# Patient Record
Sex: FEMALE | Race: WHITE | NOT HISPANIC OR LATINO | URBAN - METROPOLITAN AREA
[De-identification: names, ages, dates, MRNs, and addresses within clinical notes are randomized per-mention and may not be internally consistent; named-entity substitution may affect disease eponyms.]

---

## 2017-06-26 ENCOUNTER — EMERGENCY (EMERGENCY)
Facility: HOSPITAL | Age: 47
LOS: 1 days | Discharge: HOME | End: 2017-06-26
Admitting: EMERGENCY MEDICINE
Payer: COMMERCIAL

## 2017-06-26 PROCEDURE — 99284 EMERGENCY DEPT VISIT MOD MDM: CPT

## 2017-06-27 ENCOUNTER — APPOINTMENT (OUTPATIENT)
Dept: RADIOLOGY | Facility: HOSPITAL | Age: 47
End: 2017-06-27

## 2017-06-27 ENCOUNTER — OUTPATIENT (OUTPATIENT)
Dept: OUTPATIENT SERVICES | Facility: HOSPITAL | Age: 47
LOS: 1 days | End: 2017-06-27
Payer: COMMERCIAL

## 2017-06-27 PROCEDURE — 99284 EMERGENCY DEPT VISIT MOD MDM: CPT | Mod: 25

## 2017-06-27 PROCEDURE — 72114 X-RAY EXAM L-S SPINE BENDING: CPT

## 2017-06-27 PROCEDURE — 96372 THER/PROPH/DIAG INJ SC/IM: CPT

## 2017-06-27 PROCEDURE — 81025 URINE PREGNANCY TEST: CPT

## 2019-09-27 ENCOUNTER — RESULT REVIEW (OUTPATIENT)
Age: 49
End: 2019-09-27

## 2019-09-27 ENCOUNTER — OUTPATIENT (OUTPATIENT)
Dept: OUTPATIENT SERVICES | Facility: HOSPITAL | Age: 49
LOS: 1 days | Discharge: HOME | End: 2019-09-27

## 2019-10-01 LAB — SURGICAL PATHOLOGY STUDY: SIGNIFICANT CHANGE UP

## 2019-11-12 ENCOUNTER — TRANSCRIPTION ENCOUNTER (OUTPATIENT)
Age: 49
End: 2019-11-12

## 2020-07-25 ENCOUNTER — TRANSCRIPTION ENCOUNTER (OUTPATIENT)
Age: 50
End: 2020-07-25

## 2020-10-01 ENCOUNTER — TRANSCRIPTION ENCOUNTER (OUTPATIENT)
Age: 50
End: 2020-10-01

## 2020-10-15 ENCOUNTER — RESULT REVIEW (OUTPATIENT)
Age: 50
End: 2020-10-15

## 2020-10-24 ENCOUNTER — TRANSCRIPTION ENCOUNTER (OUTPATIENT)
Age: 50
End: 2020-10-24

## 2020-10-29 ENCOUNTER — TRANSCRIPTION ENCOUNTER (OUTPATIENT)
Age: 50
End: 2020-10-29

## 2020-11-11 ENCOUNTER — EMERGENCY (EMERGENCY)
Facility: HOSPITAL | Age: 50
LOS: 1 days | Discharge: HOME | End: 2020-11-11
Attending: EMERGENCY MEDICINE | Admitting: EMERGENCY MEDICINE
Payer: COMMERCIAL

## 2020-11-11 VITALS
HEIGHT: 69 IN | TEMPERATURE: 97 F | DIASTOLIC BLOOD PRESSURE: 72 MMHG | HEART RATE: 70 BPM | OXYGEN SATURATION: 99 % | WEIGHT: 154.98 LBS | SYSTOLIC BLOOD PRESSURE: 104 MMHG | RESPIRATION RATE: 17 BRPM

## 2020-11-11 DIAGNOSIS — Z20.828 CONTACT WITH AND (SUSPECTED) EXPOSURE TO OTHER VIRAL COMMUNICABLE DISEASES: ICD-10-CM

## 2020-11-11 LAB — SARS-COV-2 RNA SPEC QL NAA+PROBE: SIGNIFICANT CHANGE UP

## 2020-11-11 PROCEDURE — 99283 EMERGENCY DEPT VISIT LOW MDM: CPT

## 2020-11-11 PROCEDURE — U0003: CPT

## 2020-11-11 RX ORDER — BUPROPION HYDROCHLORIDE 150 MG/1
0 TABLET, EXTENDED RELEASE ORAL
Qty: 0 | Refills: 0 | DISCHARGE

## 2020-11-11 NOTE — ED PROVIDER NOTE - PROGRESS NOTE DETAILS
SERGE Barker: Seen pt with Dr. Mcpherson. Agree with above. I documented PE. Pt stable for dc and fu with pmd.

## 2020-11-11 NOTE — ED ADULT NURSE NOTE - OBJECTIVE STATEMENT
Patient endorses exposure to a person who tested positive with Covid today last saturday. patient is asymptomatic, denies, fever, no cough, SOB, nausea, vomiting, dizziness, weakness, diarrhea.

## 2020-11-11 NOTE — ED ADULT NURSE NOTE - NSFALLRSKHARMRISK_ED_ALL_ED
[facility] on file [employer]) sent a fax requesting a refill of   Requested Prescriptions     Pending Prescriptions Disp Refills    fluticasone furoate-vilanteroL (Breo Ellipta) 100-25 mcg/dose inhaler 1 Inhaler 5     Sig: take 1 inhalation by mouth once daily no

## 2020-11-11 NOTE — ED PROVIDER NOTE - NSFOLLOWUPINSTRUCTIONS_ED_ALL_ED_FT
1. You were seen in the ED and underwent testing for the novel coronarvirus (COVID-19). The results are not back yet and you will be contacted with the result which can take up to 7 days.  You will be notified if you test positive for any of these.    2. Until your test results are back, YOU MUST SELF-QUARANTINE until you are told to other otherwise by [facility] or the local [facility] department. This is extremely important to limit the spread of this virus. Please refer closely to the packet provided to you on the specifics of the process of self-quarantine.    3. If you end up testing positive for the virus, you will instructed as to when you can return to your usual activities. If you do not hear from anyone in 7 days, please call [phone] or your local health department for guidance.     4. Return to the ED for difficulty breathing.    5. You may over the counter acetaminophen (Tylenol) 650mg every 6 hours as needed for fever or pain. There is some concern in the medical community about using ibuprofen (Advil, Motrin) and other NSAIDs in people with COVID infections and until there is more research on this subject it may be best to avoid NSAIDs like ibuprofen at the moment unless you have an allergy to acetaminophen (Tylenol).  Do NOT exceed 3500mg acetaminophen in 24 hours.  Please do not take these medications if you do not have pain or fever or if you have any history of liver disease.     -------------    What is a coronavirus?  Coronaviruses are a large family of viruses that cause illnesses ranging from the common cold to more severe diseases such as Middle East Respiratory Syndrome (MERS) and Severe Acute Respiratory Syndrome (SARS).    What is Novel Coronavirus (COVID-19)?  The Centers for Disease Control and Prevention (CDC) is closely monitoring the outbreak caused by COVID-19. For the latest information about COVID-19, visit the CDC website at CDC.gov/Coronavirus    How are coronaviruses spread?  Coronaviruses can be transmitted from person to person, usually after close contact with an infected  person (for example, in a household, workplace, or healthcare setting), via droplets that become airborne after a cough or sneeze. These droplets can then infect a nearby person. Transmission can also occur by touching recently contaminated surfaces.    Is there a treatment for a COVID-19?  There is no specific treatment for disease caused by COVID-19. However, many of the symptoms can be treated based on the patient’s clinical condition. Supportive care for infected persons can be highly effective.    What are the symptoms of coronavirus infection?  It depends on the virus, but common signs include fever and/or respiratory symptoms such as cough and shortness of breath. In more severe cases, infection can cause pneumonia, severe acute respiratory syndrome, kidney failure and even death. Fortunately, most cases of COVID-19 have an illness no different than the influenza (flu), with a majority of these patients having mild symptoms and overall mortality which appears to be not much different than the flu.    What can I do to protect myself?  The best precautionary measures:  – washing your hands  – covering your cough  – disinfecting surfaces  – it is also advisable to avoid close contact with anyone showing symptoms of respiratory illness such as coughing and sneezing  – those with symptoms should wear a surgical mask when around others    What can I do to protect those around me?  If you have been identified as someone who may be infected with COVID-19, we recommend you follow the self-isolation procedures outlined on the following page to protect those around you and to limit the spread of this virus.    We recommend the below precautionary steps from now until 14 days from when you returned from your travel or date of your last known possible contact:    — Do not go to work, school or public areas. Avoid using public transportation, ridesharing or taxis.  — As much as possible, separate yourself from other people in your home. If you can, you should stay in a room and away from other people. Also, you should use a separate bathroom if available.  — Wear the supplied mask whenever you are around other people.  — If you have a non-urgent medical appointment, please reschedule for a later date. If the appointment is urgent, please call the health care provider and tell them that you are on self-isolation for possible COVID-19. This will help the health care provider’s office take steps to keep other people from getting infected or exposed. If you can reschedule routine appointments, do so.  — Wash your hands often with soap and water for at least 15 to 20 seconds or clean your hands with an alcohol-based hand  that contains 60 to 95% alcohol, covering all surfaces of your hands and rubbing them together until they feel dry. Soap and water should be used preferentially if hands are visibly dirty.  — Cover your mouth and nose with a tissue when you cough or sneeze. Throw used tissues in a lined trash can. Immediately wash your hands.  — Avoid touching your eyes, nose, and mouth with your hands.  — Avoid sharing personal household items. You should not share dishes, drinking glasses, cups, eating utensils, towels, or bedding with other people or pets in your home. After using these items, they should be washed thoroughly with soap and water.  — Clean and disinfect all “high-touch” surfaces every day. High touch surfaces include counters, tabletops, doorknobs, light switches, remote controls, bathroom fixtures, toilets, phones, keyboards, tablets, and bedside tables. Also, clean any surfaces that may have blood, stool, or body fluids on them.    ------------------------------------------  Information for patients who have received a COVID-19 test.    The COVID-19 (novel coronavirus) test  Results may take up to 7 days to become available.      If your result is positive, you will receive a phone call from one of our coronavirus specialists. While we will do our best to also call patients with a negative test result, the sheer volume of tests being performed may make this difficult to do in a timely fashion. If you haven’t heard from us within 5 days and you’d like to check on your results, you can check our [facility] or call one of our coronavirus specialists at [address] [place] [address] (available 24/7)    Please DO NOT call the site where you received the test to obtain your results.    If the test is positive -   You will continue home isolation until you are completely well, you have no fever, and it has been at least 14 days since your positive test. The health department in your city or [facility] may also contact you with additional instructions.    If your test is negative -    You will be able to stop home isolation and resume standard precautions, similiar to how you would manage the common cold or flu.  If you have any questions, you can reach out to one of our coronavirus specialists at [address] [place] [address].    REMEMBER - a negative COVID test means you were negative AT THE TIME OF TESTING, and it is possible to have contracted COVID after being tested.

## 2020-11-11 NOTE — ED PROVIDER NOTE - PATIENT PORTAL LINK FT
You can access the FollowMyHealth Patient Portal offered by [facility] by registering at the following website: [email or web address]. By joining [employer]’s FollowMyHealth portal, you will also be able to view your health information using other applications (apps) compatible with our system.

## 2020-11-11 NOTE — ED PROVIDER NOTE - ATTENDING CONTRIBUTION TO CARE
Dr. Mcpherson: I performed a face to face bedside interview with patient regarding history of present illness, review of symptoms and past medical history. I completed an independent physical exam.  I have discussed patient's plan of care with PA.   I agree with note as stated above, having amended the EMR as needed to reflect my findings.   This includes HISTORY OF PRESENT ILLNESS, HIV, PAST MEDICAL/SURGICAL/FAMILY/SOCIAL HISTORY, ALLERGIES AND HOME MEDICATIONS, REVIEW OF SYSTEMS, PHYSICAL EXAM, and any PROGRESS NOTES during the time I functioned as the attending physician for this patient.    see mdm

## 2020-11-11 NOTE — ED PROVIDER NOTE - OBJECTIVE STATEMENT
Dr. Mcpherson: 50F with no PMHx requesting covid testing because she was exposed to an asx pt 4 days ago. Pt has no sx.

## 2020-12-23 ENCOUNTER — EMERGENCY (EMERGENCY)
Facility: HOSPITAL | Age: 50
LOS: 1 days | Discharge: HOME | End: 2020-12-23
Attending: INTERNAL MEDICINE | Admitting: INTERNAL MEDICINE
Payer: COMMERCIAL

## 2020-12-23 VITALS
DIASTOLIC BLOOD PRESSURE: 66 MMHG | TEMPERATURE: 99 F | SYSTOLIC BLOOD PRESSURE: 118 MMHG | RESPIRATION RATE: 16 BRPM | OXYGEN SATURATION: 98 % | WEIGHT: 154.98 LBS | HEIGHT: 69 IN | HEART RATE: 69 BPM

## 2020-12-23 DIAGNOSIS — R51.9 HEADACHE, UNSPECIFIED: ICD-10-CM

## 2020-12-23 PROCEDURE — 99283 EMERGENCY DEPT VISIT LOW MDM: CPT

## 2020-12-23 PROCEDURE — 99284 EMERGENCY DEPT VISIT MOD MDM: CPT

## 2020-12-23 PROCEDURE — U0003: CPT

## 2020-12-23 NOTE — ED PROVIDER NOTE - ATTENDING CONTRIBUTION TO CARE
covid swab aymptomatic pt  Dr. Freeman:  I have reviewed and discussed with the PA/ resident the case specifics, including the history, physical assessment, evaluation, conclusion, laboratory results, and medical plan. I agree with the contents, and conclusions. I have personally examined, and interviewed the patient.

## 2020-12-23 NOTE — ED PROVIDER NOTE - PATIENT PORTAL LINK FT
You can access the FollowMyHealth Patient Portal offered by [facility] by registering at the following website: [email or web address]. By joining [employer]’s FollowMyHealth portal, you will also be able to view your health information using other applications (apps) compatible with our system.

## 2020-12-23 NOTE — ED PROVIDER NOTE - NSFOLLOWUPINSTRUCTIONS_ED_ALL_ED_FT
Viral Illness, Adult  Viruses are tiny germs that can get into a person's body and cause illness. There are many different types of viruses, and they cause many types of illness. Viral illnesses can range from mild to severe. They can affect various parts of the body.  Common illnesses that are caused by a virus include colds and the flu. Viral illnesses also include serious conditions such as HIV/AIDS (human immunodeficiency virus/acquired immunodeficiency syndrome). A few viruses have been linked to certain cancers.  What are the causes?  Many types of viruses can cause illness. Viruses invade cells in your body, multiply, and cause the infected cells to malfunction or die. When the cell dies, it releases more of the virus. When this happens, you develop symptoms of the illness, and the virus continues to spread to other cells. If the virus takes over the function of the cell, it can cause the cell to divide and grow out of control, as is the case when a virus causes cancer.  Different viruses get into the body in different ways. You can get a virus by:  Swallowing food or water that is contaminated with the virus.Breathing in droplets that have been coughed or sneezed into the air by an infected person.Touching a surface that has been contaminated with the virus and then touching your eyes, nose, or mouth.Being bitten by an insect or animal that carries the virus.Having sexual contact with a person who is infected with the virus.Being exposed to blood or fluids that contain the virus, either through an open cut or during a transfusion.If a virus enters your body, your body's defense system (immune system) will try to fight the virus. You may be at higher risk for a viral illness if your immune system is weak.  What are the signs or symptoms?  Symptoms vary depending on the type of virus and the location of the cells that it invades. Common symptoms of the main types of viral illnesses include:  Cold and flu viruses     Fever.Headache.Sore throat.Muscle aches.Nasal congestion.Cough.Digestive system (gastrointestinal) viruses     Fever.Abdominal pain.Nausea.Diarrhea.Liver viruses (hepatitis)     Loss of appetite.Tiredness.Yellowing of the skin (jaundice).Brain and spinal cord viruses     Fever.Headache.Stiff neck.Nausea and vomiting.Confusion or sleepiness.Skin viruses     Warts.Itching.Rash.Sexually transmitted viruses     Discharge.Swelling.Redness.Rash.How is this treated?  Viruses can be difficult to treat because they live within cells. Antibiotic medicines do not treat viruses because these drugs do not get inside cells. Treatment for a viral illness may include:  Resting and drinking plenty of fluids.Medicines to relieve symptoms. These can include over-the-counter medicine for pain and fever, medicines for cough or congestion, and medicines to relieve diarrhea.Antiviral medicines. These drugs are available only for certain types of viruses. They may help reduce flu symptoms if taken early. There are also many antiviral medicines for hepatitis and HIV/AIDS.Some viral illnesses can be prevented with vaccinations. A common example is the flu shot.  Follow these instructions at home:  Medicines        Take over-the-counter and prescription medicines only as told by your health care provider.If you were prescribed an antiviral medicine, take it as told by your health care provider. Do not stop taking the medicine even if you start to feel better.Be aware of when antibiotics are needed and when they are not needed. Antibiotics do not treat viruses. If your health care provider thinks that you may have a bacterial infection as well as a viral infection, you may get an antibiotic.  Do not ask for an antibiotic prescription if you have been diagnosed with a viral illness. That will not make your illness go away faster.Frequently taking antibiotics when they are not needed can lead to antibiotic resistance. When this develops, the medicine no longer works against the bacteria that it normally fights.General instructions     Drink enough fluids to keep your urine clear or pale yellow.Rest as much as possible.Return to your normal activities as told by your health care provider. Ask your health care provider what activities are safe for you.Keep all follow-up visits as told by your health care provider. This is important.How is this prevented?  Take these actions to reduce your risk of viral infection:  Eat a healthy diet and get enough rest.Wash your hands often with soap and water. This is especially important when you are in public places. If soap and water are not available, use hand .Avoid close contact with friends and family who have a viral illness.If you travel to areas where viral gastrointestinal infection is common, avoid drinking water or eating raw food.Keep your immunizations up to date. Get a flu shot every year as told by your health care provider.Do not share toothbrushes, nail clippers, razors, or needles with other people.Always practice safe sex.Contact a health care provider if:  You have symptoms of a viral illness that do not go away.Your symptoms come back after going away.Your symptoms get worse.Get help right away if:  You have trouble breathing.You have a severe headache or a stiff neck.You have severe vomiting or abdominal pain.This information is not intended to replace advice given to you by your health care provider. Make sure you discuss any questions you have with your health care provider.    1. You were seen in the ED and underwent testing for the novel coronarvirus (COVID-19). The results are not back yet and you will be contacted with the result in 5-7 days, but may take longer. You were also tested for other common viruses such as the flu and cold viruses. You will be notified if you test positive for any of these.    2. Until your test results are back, YOU MUST SELF-QUARANTINE until you are told to other otherwise by [facility] or the local [facility] department. This is extremely important to limit the spread of this virus. Please refer closely to the packet provided to you on the specifics of the process of self-quarantine.    3. If you end up testing positive for the virus, you will instructed as to when you can return to your usual activities. If you do not hear from anyone in 7 days, call [address] [place] [address].     4. Return to the ED for difficulty breathing.    5. You may take over the counter acetaminophen (Tylenol) 650mg every 6 hours as needed for fever or pain. There is some concern in the medical community about using ibuprofen (Advil, Motrin) and other NSAIDs in people with COVID infections and until there is more research on this subject it may be best to avoid NSAIDs like ibuprofen, unless you have an allergy to acetaminophen (Tylenol).  Do NOT exceed 3500mg acetaminophen in 24 hours.  Please do not take these medications if you do not have pain or fever or if you have any history of liver disease.     -------------    What is a coronavirus?  Coronaviruses are a large family of viruses that cause illnesses ranging from the common cold to more severe diseases such as Middle East Respiratory Syndrome (MERS) and Severe Acute Respiratory Syndrome (SARS).    What is Novel Coronavirus (COVID-19)?  The Centers for Disease Control and Prevention (CDC) is closely monitoring the outbreak caused by COVID-19. For the latest information about COVID-19, visit the CDC website at CDC.gov/Coronavirus    How are coronaviruses spread?  Coronaviruses can be transmitted from person-toperson, usually after close contact with an infected  person (for example, in a household, workplace, or healthcare setting), via droplets that become airborne after a cough or sneeze. These droplets can then infect a nearby person. Transmission can also occur by touching recently contaminated surfaces.    Is there a treatment for a COVID-19?  There is no specific treatment for disease caused by COVID-19. However, many of the symptoms can be treated based on the patient’s clinical condition. Supportive care for infected persons can be highly effective.    What are the symptoms of coronavirus infection?  It depends on the virus, but common signs include fever and/or respiratory symptoms such as cough and shortness of breath. In more severe cases, infection can cause pneumonia, severe acute respiratory syndrome, kidney failure and even death. Fortunately, most cases of COVID-19 have an illness no different than the influenza (flu), with a majority of these patients having mild symptoms and overall mortality which appears to be not much different than the flu.    What can I do to protect myself?  The best precautionary measures:  – washing your hands  – covering your cough  – disinfecting surfaces  – it is also advisable to avoid close contact with anyone showing symptoms of respiratory illness such as coughing and sneezing  – those with symptoms should wear a surgical mask when around others    What can I do to protect those around me?  If you have been identified as someone who may be infected with COVID-19, we recommend you follow the self-isolation procedures outlined on the following page to protect those around you and to limit the spread of this virus.    We recommend the below precautionary steps from now until 14 days from when you returned from your travel or date of your last known possible contact:    — Do not go to work, school or public areas. Avoid using public transportation, ridesharing or taxis.  — As much as possible, separate yourself from other people in your home. If you can, you should stay in a room and away from other people. Also, you should use a separate bathroom if available.  — Wear the supplied mask whenever you are around other people.  — If you have a non-urgent medical appointment, please reschedule for a later date. If the appointment is urgent, please call the health care provider and tell them that you are on self-isolation for possible COVID-19. This will help the health care provider’s office take steps to keep other people from getting infected or exposed. If you can reschedule routine appointments, do so.  — Wash your hands often with soap and water for at least 15 to 20 seconds or clean your hands with an alcohol-based hand  that contains 60 to 95% alcohol, covering all surfaces of your hands and rubbing them together until they feel dry. Soap and water should be used preferentially if hands are visibly dirty.  — Cover your mouth and nose with a tissue when you cough or sneeze. Throw used tissues in a lined trash can. Immediately wash your hands.  — Avoid touching your eyes, nose, and mouth with your hands.  — Avoid sharing personal household items. You should not share dishes, drinking glasses, cups, eating utensils, towels, or bedding with other people or pets in your home. After using these items, they should be washed thoroughly with soap and water.  — Clean and disinfect all “high-touch” surfaces every day. High touch surfaces include counters, tabletops, doorknobs, light switches, remote controls, bathroom fixtures, toilets, phones, keyboards, tablets, and bedside tables. Also, clean any surfaces that may have blood, stool, or body fluids on them.    ------------------------------------------  Information for patients who have received a COVID-19 test.    The COVID-19 (novel coronavirus) test  Results may take up to 5-7 days to become available.      If your result is positive, you will receive a phone call from one of our coronavirus specialists. While we will do our best to also call patients with a negative test result, the sheer volume of tests being performed may make this difficult to do in a timely fashion. If you haven’t heard from us within 5 days and you’d like to check on your results, you can check our [facility] or call one of our coronavirus specialists at [address] [place] [address] (available 24/7)    Please DO NOT call the site where you received the test to obtain your results.    If the test is positive -   You will continue home isolation until you are completely well, you have no fever, and it has been at least 14 days since your positive test. The health department in your city or county may also contact you with additional instructions.    If your test is negative -    You will be able to stop home isolation and resume standard precautions, similar to how you would manage the common cold or flu.  If you have any questions, you can reach out to one of our coronavirus specialists  at [address] [place] [address].    REMEMBER - a negative COVID test means you were negative AT THE TIME OF TESTING, and it is possible to have contracted COVID after being tested.        CORONAVIRUS DISCHARGE INSTRUCTIONS  COVID-19 (Coronavirus Disease 2019)    WHAT YOU NEED TO KNOW:    COVID-19 is the disease caused by the 2019 novel (new) coronavirus. It was first found in individuals in a part of [place] in late 2019. The virus is spreading to other countries as infected persons travel. Coronaviruses generally cause respiratory (nose, throat, and lung) infections, such as a cold. They can also cause serious infections such as Middle East respiratory syndrome (MERS) and severe acute respiratory syndrome (SARS). The new virus is related to the SARS coronavirus, so it is officially named SARS coronavirus 2 (SARS-CoV-2).    DISCHARGE INSTRUCTIONS:    If you think you or someone you know may be infected: It is important for anyone who may be infected to get tested right away. Do the following to protect others:     If emergency care is needed, tell the  about the possible infection, or call ahead and tell the emergency department.      Call a healthcare provider to be seen in the office. Anyone who may be infected should not arrive without calling first. The provider will need to protect staff members and other patients.      The person who may be infected needs to wear a medical mask before getting medical care. The mask needs to stay on until the provider says to remove it.    Call your local emergency number (911 in the [place]) or go to the emergency department if: You have signs or symptoms of COVID-19, and any of the following is true:     You traveled within the last 14 days to an area where the virus is active or had close contact with someone who did.      You had close contact within the last 14 days with someone who has a confirmed infection.    Call your doctor if: You do not have signs or symptoms of COVID-19, but any of the following is true:     You traveled within the last 14 days to an area where the virus is active or had close contact with someone who did.      You had close contact within the last 14 days with someone who has a confirmed infection.      You have questions or concerns about your condition or care.    Medicines: You may need any of the following for mild symptoms:     Decongestants help reduce nasal congestion and help you breathe more easily. If you take decongestant pills, they may make you feel restless or cause problems with your sleep. Do not use decongestant sprays for more than a few days.      Cough suppressants help reduce coughing. Ask your healthcare provider which type of cough medicine is best for you.      Acetaminophen decreases pain and fever. It is available without a doctor's order. Ask how much to take and how often to take it. Follow directions. Read the labels of all other medicines you are using to see if they also contain acetaminophen, or ask your doctor or pharmacist. Acetaminophen can cause liver damage if not taken correctly. Do not use more than 4 grams (4,000 milligrams) total of acetaminophen in one day.     Take your medicine as directed. Contact your healthcare provider if you think your medicine is not helping or if you have side effects. Tell him or her if you are allergic to any medicine. Keep a list of the medicines, vitamins, and herbs you take. Include the amounts, and when and why you take them. Bring the list or the pill bottles to follow-up visits. Carry your medicine list with you in case of an emergency.    How the 2019 coronavirus spreads: The virus appears to spread quickly and easily. The following are ways the virus is thought to spread, but more information may be coming:     Droplets are the most common way all coronaviruses spread. The virus can travel in droplets that form when a person talks, coughs, or sneezes. Anyone who breathes in the virus or gets it in his or her eyes can become infected.      An infected person may be able to leave the virus on objects and surfaces. Another person can get the virus on his or her hands by touching the object or surface. Infection happens if the person then touches his or her eyes or mouth with unwashed hands. It is not yet known how long the virus can stay on an object or surface. Evidence shows it may be as long as 9 days at room temperature.      Person-to-person contact may spread the virus. For example, an infected person can spread the virus by shaking hands with someone. At this time, it does not appear that the virus can be passed to a baby during pregnancy or delivery. The virus also does not appear to spread during breastfeeding. If you are pregnant or breastfeeding, talk to your healthcare provider or obstetrician about any concerns you have.      An infected animal may be able to infect a person who touches it. This may happen at live markets or on a farm.    Prevent a 2019 coronavirus infection: Everyone should do the following to prevent getting or spreading the virus:     Wash your hands often. Use soap and water every time you wash your hands. Rub your soapy hands together, lacing your fingers. Use the fingers of one hand to scrub under the nails of the other hand. Wash for at least 20 seconds. Rinse with warm, running water for several seconds. Then dry your hands. Use germ-killing gel if soap and water are not available. Do not touch your eyes or mouth without washing your hands first. Handwashing           Cover a sneeze or cough. Use a tissue that covers your mouth and nose. Throw the tissue away in a trash can right away. Use the bend of your arm if a tissue is not available. Wash your hands well with soap and water or use a hand . Do not stand close to anyone who is sneezing or coughing.      Be careful around others. The best way to prevent infection is to avoid anyone who is infected, but this may be difficult. An infected person may be able to spread the virus before signs or symptoms begin. Until more is known, you may not want to shake hands with others. If you do shake hands, wash your hands or use hand  as soon as possible. You do not need to wear a medical mask if you are well and not caring for an infected person.      Ask about vaccines you may need. No vaccine is available for the new coronavirus. But any infection can affect your immune system. A weakened immune system makes you more vulnerable to the new coronavirus. Until a vaccine against the new virus is developed, do the following:   Get a flu vaccine as soon as recommended each year. The flu vaccine is available starting in September or October. Flu viruses change, so it is important to get a flu vaccine every year.      Talk to your healthcare provider about your vaccine history. Tell him or her if you did not get certain vaccines as a child, or you did not get all recommended doses. Tell him or her if you do not know your vaccine history. He or she will tell you which vaccines you need, and when to get them.           If you have COVID-19: Healthcare providers will give you specific instructions to follow. The following are general guidelines to remind you of how to keep others safe until you are well:     Limit close contact with others. Your healthcare provider will tell you when it is okay to be around others. This may be when you do not have a fever, do not take fever medicine, and have no symptoms. Fluid from your respiratory tract will need to test negative for the virus 2 times at least 24 hours apart. Until then, do the following along with any instructions from your provider:   Only go out of the house for medical appointments. Always call the provider’s office first so he or she can prepare to keep others safe.      Stay at least 6 feet (2 meters) away from others.      Sleep in a different room from others in the house.      Do not shake hands with other people.      Wear a medical mask when others are near you. This can help prevent droplets from spreading the virus when you talk, sneeze, or cough.      Do not share items. Do not share dishes, towels, or other items with anyone. Items need to be washed after you use them.      Do not handle live animals. The virus may be spread to animals, including pets. Until more is known, it is best not to touch, play with, or handle live animals.    Self-care:     Drink more liquids as directed. Liquids will help thin and loosen mucus so you can cough it up. Liquids will also help prevent dehydration. Liquids that help prevent dehydration include water, fruit juice, and broth. Do not drink liquids that contain caffeine. Caffeine can increase your risk for dehydration. Ask your healthcare provider how much liquid to drink each day.      Soothe a sore throat. Gargle with warm salt water. This may help your sore throat feel better. Make salt water by dissolving ¼ teaspoon salt in 1 cup warm water. You may also suck on hard candy or throat lozenges. You may use a sore throat spray.      Use a humidifier or vaporizer. Use a cool mist humidifier or a vaporizer to increase air moisture in your home. This may make it easier for you to breathe and help decrease your cough.      Use saline nasal drops as directed. These help relieve congestion.      Apply petroleum-based jelly around the outside of your nostrils. This can decrease irritation from blowing your nose.      Do not smoke. Nicotine and other chemicals in cigarettes and cigars can make your symptoms worse. They can also cause infections such as bronchitis or pneumonia. Ask your healthcare provider for information if you currently smoke and need help to quit. E-cigarettes or smokeless tobacco still contain nicotine. Talk to your healthcare provider before you use these products.    If you take care of someone who has COVID-19:     Wear a medical mask when you are near the person. This can help protect you from droplets that carry the virus when the person talks, sneezes, or coughs.      Do not allow others to go near the person. No one should come to the person's home unless it is necessary. Keep the room's door shut unless you need to go in or out.      Make sure the person's room has good air flow. You may be able to open the window if the weather allows. An air conditioner can also be turned on to help air move.      Clean items the person uses or touches. Wear disposable gloves to handle the person's laundry. Place the laundry in a plastic bag. Use hot water and soap to wash the person's laundry. Wash eating utensils and other items after the person uses them. Throw your gloves away after you use them.      Clean surfaces often. Use bleach diluted with water or disinfecting wipes. Clean counters, doorknobs, toilet seats, and other surfaces.    Follow up with your doctor as directed: Write down your questions so you remember to ask them during your visits.    For more information:     Centers for Disease Control and Prevention  [address]  [place], [place]  Phone: [phone]  Phone: [phone]  Web Address: http://www.cdc.gov

## 2020-12-23 NOTE — ED ADULT NURSE NOTE - NSIMPLEMENTINTERV_GEN_ALL_ED
Implemented All Universal Safety Interventions:  [place] to call system. Call bell, personal items and telephone within reach. Instruct patient to call for assistance. Room bathroom lighting operational. Non-slip footwear when patient is off stretcher. Physically safe environment: no spills, clutter or unnecessary equipment. Stretcher in lowest position, wheels locked, appropriate side rails in place.

## 2020-12-23 NOTE — ED PROVIDER NOTE - OBJECTIVE STATEMENT
pt 49 yo f here for covid tested. +contact with COVID19. mild ha and runny nose  denies rash, abd pain, diarrhea, n/v, vision changes, SOB, CP, difficulty tolerating PO, cough, fever, chills, sore throat, diffuse body aches  no recent travel

## 2020-12-24 LAB — SARS-COV-2 RNA SPEC QL NAA+PROBE: SIGNIFICANT CHANGE UP

## 2020-12-25 ENCOUNTER — TRANSCRIPTION ENCOUNTER (OUTPATIENT)
Age: 50
End: 2020-12-25

## 2021-01-29 ENCOUNTER — TRANSCRIPTION ENCOUNTER (OUTPATIENT)
Age: 51
End: 2021-01-29

## 2021-02-03 ENCOUNTER — APPOINTMENT (OUTPATIENT)
Dept: INTERNAL MEDICINE | Facility: CLINIC | Age: 51
End: 2021-02-03
Payer: COMMERCIAL

## 2021-02-03 ENCOUNTER — NON-APPOINTMENT (OUTPATIENT)
Age: 51
End: 2021-02-03

## 2021-02-03 VITALS
DIASTOLIC BLOOD PRESSURE: 60 MMHG | OXYGEN SATURATION: 98 % | RESPIRATION RATE: 14 BRPM | SYSTOLIC BLOOD PRESSURE: 94 MMHG | BODY MASS INDEX: 22.96 KG/M2 | HEART RATE: 69 BPM | HEIGHT: 69 IN | WEIGHT: 155 LBS | TEMPERATURE: 97.1 F

## 2021-02-03 DIAGNOSIS — R92.2 INCONCLUSIVE MAMMOGRAM: ICD-10-CM

## 2021-02-03 DIAGNOSIS — Z23 ENCOUNTER FOR IMMUNIZATION: ICD-10-CM

## 2021-02-03 DIAGNOSIS — Z82.0 FAMILY HISTORY OF EPILEPSY AND OTHER DISEASES OF THE NERVOUS SYSTEM: ICD-10-CM

## 2021-02-03 DIAGNOSIS — Z80.3 FAMILY HISTORY OF MALIGNANT NEOPLASM OF BREAST: ICD-10-CM

## 2021-02-03 PROCEDURE — 90471 IMMUNIZATION ADMIN: CPT

## 2021-02-03 PROCEDURE — 99396 PREV VISIT EST AGE 40-64: CPT | Mod: 25

## 2021-02-03 PROCEDURE — 99072 ADDL SUPL MATRL&STAF TM PHE: CPT

## 2021-02-03 PROCEDURE — 93000 ELECTROCARDIOGRAM COMPLETE: CPT

## 2021-02-03 PROCEDURE — 90715 TDAP VACCINE 7 YRS/> IM: CPT

## 2021-02-03 NOTE — PHYSICAL EXAM
[No Acute Distress] : no acute distress [Well Nourished] : well nourished [Well Developed] : well developed [Well-Appearing] : well-appearing [Normal Sclera/Conjunctiva] : normal sclera/conjunctiva [PERRL] : pupils equal round and reactive to light [EOMI] : extraocular movements intact [Normal Outer Ear/Nose] : the outer ears and nose were normal in appearance [Normal Oropharynx] : the oropharynx was normal [No JVD] : no jugular venous distention [No Lymphadenopathy] : no lymphadenopathy [Supple] : supple [Thyroid Normal, No Nodules] : the thyroid was normal and there were no nodules present [No Respiratory Distress] : no respiratory distress  [No Accessory Muscle Use] : no accessory muscle use [Clear to Auscultation] : lungs were clear to auscultation bilaterally [Normal Rate] : normal rate  [Regular Rhythm] : with a regular rhythm [Normal S1, S2] : normal S1 and S2 [No Murmur] : no murmur heard [No Carotid Bruits] : no carotid bruits [No Abdominal Bruit] : a ~M bruit was not heard ~T in the abdomen [No Varicosities] : no varicosities [Pedal Pulses Present] : the pedal pulses are present [No Edema] : there was no peripheral edema [No Palpable Aorta] : no palpable aorta [No Extremity Clubbing/Cyanosis] : no extremity clubbing/cyanosis [Soft] : abdomen soft [Non Tender] : non-tender [Non-distended] : non-distended [No HSM] : no HSM [Normal Bowel Sounds] : normal bowel sounds [Normal Posterior Cervical Nodes] : no posterior cervical lymphadenopathy [Normal Anterior Cervical Nodes] : no anterior cervical lymphadenopathy [No CVA Tenderness] : no CVA  tenderness [No Spinal Tenderness] : no spinal tenderness [No Joint Swelling] : no joint swelling [Grossly Normal Strength/Tone] : grossly normal strength/tone [No Rash] : no rash [Coordination Grossly Intact] : coordination grossly intact [No Focal Deficits] : no focal deficits [Normal Gait] : normal gait [Deep Tendon Reflexes (DTR)] : deep tendon reflexes were 2+ and symmetric [Normal Affect] : the affect was normal [Normal Insight/Judgement] : insight and judgment were intact [Normal Appearance] : normal in appearance [No Masses] : no palpable masses

## 2021-02-03 NOTE — HEALTH RISK ASSESSMENT
[] : Yes [2 - 4 times a month (2 pts)] : 2-4 times a month (2 points) [1 or 2 (0 pts)] : 1 or 2 (0 points) [No] : In the past 12 months have you used drugs other than those required for medical reasons? No [No falls in past year] : Patient reported no falls in the past year [Patient reported mammogram was normal] : Patient reported mammogram was normal [None] : None [# Of Children ___] : has [unfilled] children [Feels Safe at Home] : Feels safe at home [de-identified] : was smoking 4-5 cigarettes a day [YearQuit] : 2020 [Change in mental status noted] : No change in mental status noted [Language] : denies difficulty with language [MammogramDate] : 01/20 [FreeTextEntry3] :

## 2021-02-03 NOTE — HISTORY OF PRESENT ILLNESS
[FreeTextEntry1] : establish care\par On wellbutrin for a year which made her stop smoking.  [de-identified] : her gyn is Dr Etienne and she saw Dr Gomez for an ablation. \par She is  and soon to be . She has 2 teenage sons and a 13 y/o daughter.

## 2021-02-04 LAB
25(OH)D3 SERPL-MCNC: 30.6 NG/ML
ALBUMIN SERPL ELPH-MCNC: 4.7 G/DL
ALP BLD-CCNC: 46 U/L
ALT SERPL-CCNC: 13 U/L
ANION GAP SERPL CALC-SCNC: 13 MMOL/L
AST SERPL-CCNC: 15 U/L
BASOPHILS # BLD AUTO: 0.04 K/UL
BASOPHILS NFR BLD AUTO: 0.6 %
BILIRUB SERPL-MCNC: 0.3 MG/DL
BUN SERPL-MCNC: 12 MG/DL
CALCIUM SERPL-MCNC: 9.4 MG/DL
CHLORIDE SERPL-SCNC: 102 MMOL/L
CHOLEST SERPL-MCNC: 229 MG/DL
CO2 SERPL-SCNC: 24 MMOL/L
CREAT SERPL-MCNC: 0.72 MG/DL
EOSINOPHIL # BLD AUTO: 0.17 K/UL
EOSINOPHIL NFR BLD AUTO: 2.7 %
ESTIMATED AVERAGE GLUCOSE: 108 MG/DL
FOLATE SERPL-MCNC: 12 NG/ML
GLUCOSE SERPL-MCNC: 86 MG/DL
HBA1C MFR BLD HPLC: 5.4 %
HCT VFR BLD CALC: 37.6 %
HDLC SERPL-MCNC: 86 MG/DL
HGB BLD-MCNC: 12.3 G/DL
IMM GRANULOCYTES NFR BLD AUTO: 0.2 %
LDLC SERPL CALC-MCNC: 104 MG/DL
LYMPHOCYTES # BLD AUTO: 1.67 K/UL
LYMPHOCYTES NFR BLD AUTO: 26.2 %
MAN DIFF?: NORMAL
MCHC RBC-ENTMCNC: 32.4 PG
MCHC RBC-ENTMCNC: 32.7 GM/DL
MCV RBC AUTO: 98.9 FL
MONOCYTES # BLD AUTO: 0.59 K/UL
MONOCYTES NFR BLD AUTO: 9.3 %
NEUTROPHILS # BLD AUTO: 3.89 K/UL
NEUTROPHILS NFR BLD AUTO: 61 %
NONHDLC SERPL-MCNC: 143 MG/DL
PLATELET # BLD AUTO: 344 K/UL
POTASSIUM SERPL-SCNC: 4.3 MMOL/L
PROT SERPL-MCNC: 6.9 G/DL
RBC # BLD: 3.8 M/UL
RBC # FLD: 12.5 %
SARS-COV-2 IGG SERPL IA-ACNC: 0.07 INDEX
SARS-COV-2 IGG SERPL QL IA: NEGATIVE
SODIUM SERPL-SCNC: 139 MMOL/L
TRIGL SERPL-MCNC: 195 MG/DL
TSH SERPL-ACNC: 1.27 UIU/ML
VIT B12 SERPL-MCNC: 418 PG/ML
WBC # FLD AUTO: 6.37 K/UL

## 2021-02-09 ENCOUNTER — TRANSCRIPTION ENCOUNTER (OUTPATIENT)
Age: 51
End: 2021-02-09

## 2021-03-09 ENCOUNTER — EMERGENCY (EMERGENCY)
Facility: HOSPITAL | Age: 51
LOS: 1 days | Discharge: HOME | End: 2021-03-09
Attending: INTERNAL MEDICINE | Admitting: INTERNAL MEDICINE
Payer: COMMERCIAL

## 2021-03-09 VITALS
SYSTOLIC BLOOD PRESSURE: 102 MMHG | TEMPERATURE: 98 F | HEIGHT: 69 IN | OXYGEN SATURATION: 99 % | HEART RATE: 69 BPM | DIASTOLIC BLOOD PRESSURE: 68 MMHG | WEIGHT: 154.98 LBS | RESPIRATION RATE: 16 BRPM

## 2021-03-09 LAB — SARS-COV-2 RNA SPEC QL NAA+PROBE: SIGNIFICANT CHANGE UP

## 2021-03-09 PROCEDURE — U0003: CPT

## 2021-03-09 PROCEDURE — U0005: CPT

## 2021-03-09 PROCEDURE — 99283 EMERGENCY DEPT VISIT LOW MDM: CPT

## 2021-03-09 PROCEDURE — 99282 EMERGENCY DEPT VISIT SF MDM: CPT

## 2021-03-09 NOTE — ED PROVIDER NOTE - OBJECTIVE STATEMENT
(-) Exposure requesting COVID swab. Patient asymptomatic. Denies fever, chills, chest pain, shortness of breath, abdominal pain, nausea, vomiting, diarrhea, weakness. Appears well. Speaking in full sentences, breathing not labored.

## 2021-06-09 ENCOUNTER — TRANSCRIPTION ENCOUNTER (OUTPATIENT)
Age: 51
End: 2021-06-09

## 2021-12-20 ENCOUNTER — EMERGENCY (EMERGENCY)
Facility: HOSPITAL | Age: 51
LOS: 1 days | Discharge: HOME | End: 2021-12-20
Attending: EMERGENCY MEDICINE | Admitting: EMERGENCY MEDICINE
Payer: COMMERCIAL

## 2021-12-20 VITALS
HEIGHT: 69 IN | RESPIRATION RATE: 15 BRPM | HEART RATE: 68 BPM | DIASTOLIC BLOOD PRESSURE: 61 MMHG | WEIGHT: 149.91 LBS | SYSTOLIC BLOOD PRESSURE: 99 MMHG | OXYGEN SATURATION: 96 % | TEMPERATURE: 98 F

## 2021-12-20 PROCEDURE — 99282 EMERGENCY DEPT VISIT SF MDM: CPT

## 2021-12-20 PROCEDURE — U0005: CPT

## 2021-12-20 PROCEDURE — U0003: CPT

## 2021-12-20 PROCEDURE — 99283 EMERGENCY DEPT VISIT LOW MDM: CPT

## 2021-12-20 NOTE — ED PROVIDER NOTE - NSFOLLOWUPINSTRUCTIONS_ED_ALL_ED_FT
May also contact pharmacy to see what has been dispensed previously.     1. You were seen in the ED and underwent testing for the novel coronarvirus (COVID-19). The results are not back yet and you will be contacted with the result.    2. Until your test results are back, YOU MUST SELF-QUARANTINE until you are told to other otherwise by [facility] or the local [facility] health department. This is extremely important to limit the spread of this virus. Please refer closely to the packet provided to you on the specifics of the process of self-quarantine.    3. If you end up testing positive for the virus, you will instructed as to when you can return to your usual activities. If you do not hear from anyone in 7 days, please call [phone] or your local health department for guidance.     4. Return to the ED for difficulty breathing.    5. You may over the counter acetaminophen (Tylenol) 650mg every 6 hours as needed for fever or pain. There is some concern in the medical community about using ibuprofen (Advil, Motrin) and other NSAIDs in people with COVID infections and until there is more research on this subject it may be best to avoid NSAIDs like ibuprofen at the moment unless you have an allergy to acetaminophen (Tylenol).  Do NOT exceed 3500mg acetaminophen in 24 hours.  Please do not take these medications if you do not have pain or fever or if you have any history of liver disease.     -------------    What is a coronavirus?  Coronaviruses are a large family of viruses that cause illnesses ranging from the common cold to more severe diseases such as Middle East Respiratory Syndrome (MERS) and Severe Acute Respiratory Syndrome (SARS).    What is Novel Coronavirus (COVID-19)?  The Centers for Disease Control and Prevention (CDC) is closely monitoring the outbreak caused by COVID-19. For the latest information about COVID-19, visit the CDC website at CDC.gov/Coronavirus    How are coronaviruses spread?  Coronaviruses can be transmitted from person to person, usually after close contact with an infected  person (for example, in a household, workplace, or healthcare setting), via droplets that become airborne after a cough or sneeze. These droplets can then infect a nearby person. Transmission can also occur by touching recently contaminated surfaces.    Is there a treatment for a COVID-19?  There is no specific treatment for disease caused by COVID-19. However, many of the symptoms can be treated based on the patient’s clinical condition. Supportive care for infected persons can be highly effective.    What are the symptoms of coronavirus infection?  It depends on the virus, but common signs include fever and/or respiratory symptoms such as cough and shortness of breath. In more severe cases, infection can cause pneumonia, severe acute respiratory syndrome, kidney failure and even death. Fortunately, most cases of COVID-19 have an illness no different than the influenza (flu), with a majority of these patients having mild symptoms and overall mortality which appears to be not much different than the flu.    What can I do to protect myself?  The best precautionary measures:  – washing your hands  – covering your cough  – disinfecting surfaces  – it is also advisable to avoid close contact with anyone showing symptoms of respiratory illness such as coughing and sneezing  – those with symptoms should wear a surgical mask when around others    What can I do to protect those around me?  If you have been identified as someone who may be infected with COVID-19, we recommend you follow the self-isolation procedures outlined on the following page to protect those around you and to limit the spread of this virus.    We recommend the below precautionary steps from now until 14 days from when you returned from your travel or date of your last known possible contact:    — Do not go to work, school or public areas. Avoid using public transportation, ridesharing or taxis.  — As much as possible, separate yourself from other people in your home. If you can, you should stay in a room and away from other people. Also, you should use a separate bathroom if available.  — Wear the supplied mask whenever you are around other people.  — If you have a non-urgent medical appointment, please reschedule for a later date. If the appointment is urgent, please call the health care provider and tell them that you are on self-isolation for possible COVID-19. This will help the health care provider’s office take steps to keep other people from getting infected or exposed. If you can reschedule routine appointments, do so.  — Wash your hands often with soap and water for at least 15 to 20 seconds or clean your hands with an alcohol-based hand  that contains 60 to 95% alcohol, covering all surfaces of your hands and rubbing them together until they feel dry. Soap and water should be used preferentially if hands are visibly dirty.  — Cover your mouth and nose with a tissue when you cough or sneeze. Throw used tissues in a lined trash can. Immediately wash your hands.  — Avoid touching your eyes, nose, and mouth with your hands.  — Avoid sharing personal household items. You should not share dishes, drinking glasses, cups, eating utensils, towels, or bedding with other people or pets in your home. After using these items, they should be washed thoroughly with soap and water.  — Clean and disinfect all “high-touch” surfaces every day. High touch surfaces include counters, tabletops, doorknobs, light switches, remote controls, bathroom fixtures, toilets, phones, keyboards, tablets, and bedside tables. Also, clean any surfaces that may have blood, stool, or body fluids on them.    ------------------------------------------  Information for patients who have received a COVID-19 test.    The COVID-19 (novel coronavirus) test  Results may take up to 7 days to become available.      If your result is positive, you will receive a phone call from one of our coronavirus specialists. While we will do our best to also call patients with a negative test result, the sheer volume of tests being performed may make this difficult to do in a timely fashion. If you haven’t heard from us within 5 days and you’d like to check on your results, you can check our [employer] mendez or call one of our coronavirus specialists at [address] [place] [address] (available 24/7)    Please DO NOT call the site where you received the test to obtain your results.    If the test is positive -   You will continue home isolation until you are completely well, you have no fever, and it has been at least 14 days since your positive test. The health department in your city or county may also contact you with additional instructions.    If your test is negative -    You will be able to stop home isolation and resume standard precautions, similiar to how you would manage the common cold or flu.  If you have any questions, you can reach out to one of our coronavirus specialists at [address] [place] [address].    REMEMBER - a negative COVID test means you were negative AT THE TIME OF TESTING, and it is possible to have contracted COVID after being tested.

## 2021-12-20 NOTE — ED PROVIDER NOTE - PATIENT PORTAL LINK FT
You can access the FollowMyHealth Patient Portal offered by [facility] by registering at the following website: [email or web address]. By joining [employer]’s FollowMyHealth portal, you will also be able to view your health information using other applications (apps) compatible with our system.

## 2021-12-20 NOTE — ED PROVIDER NOTE - ATTENDING CONTRIBUTION TO CARE
Holly with [facility]. 52 yo female with no medical problems and no complaints, vaccinated with moderna booster Dec 2021. + Contact/exposure to known COVID+ person, requesting COVID19 PCR, home rapid test was reportedly negative.    (+) Exposure requesting COVID swab. Patient asymptomatic. Denies fever, chills, chest pain, shortness of breath, abdominal pain, nausea, vomiting, diarrhea, weakness. Appears well. Speaking in full sentences, breathing not labored.    I performed a face to face bedside interview with patient regarding history of present illness, review of symptoms and past medical history. I completed an independent physical exam.  I have discussed the patient's plan of care with Physician Assistant (PA). I agree with note as stated above, having amended the EMR as needed to reflect my findings.   This includes History of Present Illness, HIV, Past Medical/Surgical/Family/Social History, Allergies and Home Medications, Review of Systems, Physical Exam, and any Progress Notes during the time I functioned as the attending physician for this patient.

## 2021-12-20 NOTE — ED PROVIDER NOTE - CLINICAL SUMMARY MEDICAL DECISION MAKING FREE TEXT BOX
50 yo female with no medical problems and no complaints, vaccinated with moderna booster Dec 2021. + Contact/exposure to known COVID+ person, requesting COVID19 PCR, home rapid test was reportedly negative. 50 yo female with no medical problems and no complaints, vaccinated with moderna booster Dec 2021. + Contact/exposure to known COVID+ person, requesting COVID19 PCR, home rapid test was reportedly negative.      (+) Exposure requesting COVID swab. Patient asymptomatic. Denies fever, chills, chest pain, shortness of breath, abdominal pain, nausea, vomiting, diarrhea, weakness. Appears well. Speaking in full sentences, breathing not labored.

## 2021-12-20 NOTE — ED ADULT NURSE NOTE - NSIMPLEMENTINTERV_GEN_ALL_ED
Implemented All Universal Safety Interventions:  [place] to call system. Call bell, personal items and telephone within reach. Instruct patient to call for assistance. Room bathroom lighting operational. Non-slip footwear when patient is off stretcher. Physically safe environment: no spills, clutter or unnecessary equipment. Stretcher in lowest position, wheels locked, appropriate side rails in place.

## 2021-12-20 NOTE — ED PROVIDER NOTE - PRINCIPAL DIAGNOSIS
-[place] Sleepiness Score WNL, but STOP-BANG score 4 today  -Mallampati III-IV on exam, concurrent HTN & morbid obesity  -Referral to Sleep Medicine for further evaluation & possible PSG Encounter for screening

## 2021-12-20 NOTE — ED PROVIDER NOTE - OBJECTIVE STATEMENT
50 yo female with no medical problems and no complaints, vaccinated with moderna booster Dec 2021. + Contact/exposure to known COVID+ person, requesting COVID19 PCR, home rapid test was reportedly negative.

## 2021-12-21 LAB — SARS-COV-2 RNA SPEC QL NAA+PROBE: SIGNIFICANT CHANGE UP

## 2022-02-17 ENCOUNTER — TRANSCRIPTION ENCOUNTER (OUTPATIENT)
Age: 52
End: 2022-02-17

## 2022-03-13 ENCOUNTER — EMERGENCY (EMERGENCY)
Facility: HOSPITAL | Age: 52
LOS: 1 days | Discharge: HOME | End: 2022-03-13
Attending: INTERNAL MEDICINE | Admitting: INTERNAL MEDICINE
Payer: COMMERCIAL

## 2022-03-13 VITALS
SYSTOLIC BLOOD PRESSURE: 128 MMHG | OXYGEN SATURATION: 100 % | WEIGHT: 160.06 LBS | HEART RATE: 87 BPM | TEMPERATURE: 98 F | RESPIRATION RATE: 16 BRPM | HEIGHT: 69 IN | DIASTOLIC BLOOD PRESSURE: 75 MMHG

## 2022-03-13 LAB
ALBUMIN SERPL ELPH-MCNC: 4.1 G/DL — SIGNIFICANT CHANGE UP (ref 3.3–5)
ALP SERPL-CCNC: 48 U/L — SIGNIFICANT CHANGE UP (ref 40–120)
ALT FLD-CCNC: 29 U/L — SIGNIFICANT CHANGE UP (ref 10–45)
ANION GAP SERPL CALC-SCNC: 9 MMOL/L — SIGNIFICANT CHANGE UP (ref 5–17)
APPEARANCE UR: CLEAR — SIGNIFICANT CHANGE UP
AST SERPL-CCNC: 21 U/L — SIGNIFICANT CHANGE UP (ref 10–40)
BASOPHILS # BLD AUTO: 0.03 K/UL — SIGNIFICANT CHANGE UP (ref 0–0.2)
BASOPHILS NFR BLD AUTO: 0.5 % — SIGNIFICANT CHANGE UP (ref 0–2)
BILIRUB SERPL-MCNC: 0.4 MG/DL — SIGNIFICANT CHANGE UP (ref 0.2–1.2)
BILIRUB UR-MCNC: NEGATIVE — SIGNIFICANT CHANGE UP
BUN SERPL-MCNC: 6 MG/DL — LOW (ref 7–23)
CALCIUM SERPL-MCNC: 8.5 MG/DL — SIGNIFICANT CHANGE UP (ref 8.4–10.5)
CHLORIDE SERPL-SCNC: 108 MMOL/L — SIGNIFICANT CHANGE UP (ref 96–108)
CO2 SERPL-SCNC: 27 MMOL/L — SIGNIFICANT CHANGE UP (ref 22–31)
COLOR SPEC: YELLOW — SIGNIFICANT CHANGE UP
CREAT SERPL-MCNC: 0.71 MG/DL — SIGNIFICANT CHANGE UP (ref 0.5–1.3)
DIFF PNL FLD: NEGATIVE — SIGNIFICANT CHANGE UP
EGFR: 103 ML/MIN/1.73M2 — SIGNIFICANT CHANGE UP
EOSINOPHIL # BLD AUTO: 0.15 K/UL — SIGNIFICANT CHANGE UP (ref 0–0.5)
EOSINOPHIL NFR BLD AUTO: 2.7 % — SIGNIFICANT CHANGE UP (ref 0–6)
GLUCOSE SERPL-MCNC: 97 MG/DL — SIGNIFICANT CHANGE UP (ref 70–99)
GLUCOSE UR QL: NEGATIVE — SIGNIFICANT CHANGE UP
HCT VFR BLD CALC: 35.8 % — SIGNIFICANT CHANGE UP (ref 34.5–45)
HGB BLD-MCNC: 11.9 G/DL — SIGNIFICANT CHANGE UP (ref 11.5–15.5)
IMM GRANULOCYTES NFR BLD AUTO: 0.4 % — SIGNIFICANT CHANGE UP (ref 0–1.5)
KETONES UR-MCNC: NEGATIVE — SIGNIFICANT CHANGE UP
LEUKOCYTE ESTERASE UR-ACNC: NEGATIVE — SIGNIFICANT CHANGE UP
LYMPHOCYTES # BLD AUTO: 1.83 K/UL — SIGNIFICANT CHANGE UP (ref 1–3.3)
LYMPHOCYTES # BLD AUTO: 32.9 % — SIGNIFICANT CHANGE UP (ref 13–44)
MCHC RBC-ENTMCNC: 32.7 PG — SIGNIFICANT CHANGE UP (ref 27–34)
MCHC RBC-ENTMCNC: 33.2 GM/DL — SIGNIFICANT CHANGE UP (ref 32–36)
MCV RBC AUTO: 98.4 FL — SIGNIFICANT CHANGE UP (ref 80–100)
MONOCYTES # BLD AUTO: 0.49 K/UL — SIGNIFICANT CHANGE UP (ref 0–0.9)
MONOCYTES NFR BLD AUTO: 8.8 % — SIGNIFICANT CHANGE UP (ref 2–14)
NEUTROPHILS # BLD AUTO: 3.05 K/UL — SIGNIFICANT CHANGE UP (ref 1.8–7.4)
NEUTROPHILS NFR BLD AUTO: 54.7 % — SIGNIFICANT CHANGE UP (ref 43–77)
NITRITE UR-MCNC: NEGATIVE — SIGNIFICANT CHANGE UP
NRBC # BLD: 0 /100 WBCS — SIGNIFICANT CHANGE UP (ref 0–0)
PH UR: 8 — SIGNIFICANT CHANGE UP (ref 5–8)
PLATELET # BLD AUTO: 287 K/UL — SIGNIFICANT CHANGE UP (ref 150–400)
POTASSIUM SERPL-MCNC: 4 MMOL/L — SIGNIFICANT CHANGE UP (ref 3.5–5.3)
POTASSIUM SERPL-SCNC: 4 MMOL/L — SIGNIFICANT CHANGE UP (ref 3.5–5.3)
PROT SERPL-MCNC: 7 G/DL — SIGNIFICANT CHANGE UP (ref 6–8.3)
PROT UR-MCNC: NEGATIVE — SIGNIFICANT CHANGE UP
RBC # BLD: 3.64 M/UL — LOW (ref 3.8–5.2)
RBC # FLD: 13.2 % — SIGNIFICANT CHANGE UP (ref 10.3–14.5)
SODIUM SERPL-SCNC: 144 MMOL/L — SIGNIFICANT CHANGE UP (ref 135–145)
SP GR SPEC: 1.01 — SIGNIFICANT CHANGE UP (ref 1.01–1.02)
UROBILINOGEN FLD QL: NEGATIVE — SIGNIFICANT CHANGE UP
WBC # BLD: 5.57 K/UL — SIGNIFICANT CHANGE UP (ref 3.8–10.5)
WBC # FLD AUTO: 5.57 K/UL — SIGNIFICANT CHANGE UP (ref 3.8–10.5)

## 2022-03-13 PROCEDURE — 74177 CT ABD & PELVIS W/CONTRAST: CPT | Mod: MA

## 2022-03-13 PROCEDURE — 87086 URINE CULTURE/COLONY COUNT: CPT

## 2022-03-13 PROCEDURE — 99284 EMERGENCY DEPT VISIT MOD MDM: CPT | Mod: 25

## 2022-03-13 PROCEDURE — 76830 TRANSVAGINAL US NON-OB: CPT | Mod: 26

## 2022-03-13 PROCEDURE — 87591 N.GONORRHOEAE DNA AMP PROB: CPT

## 2022-03-13 PROCEDURE — 80053 COMPREHEN METABOLIC PANEL: CPT

## 2022-03-13 PROCEDURE — 87491 CHLMYD TRACH DNA AMP PROBE: CPT

## 2022-03-13 PROCEDURE — 96374 THER/PROPH/DIAG INJ IV PUSH: CPT | Mod: XU

## 2022-03-13 PROCEDURE — 36415 COLL VENOUS BLD VENIPUNCTURE: CPT

## 2022-03-13 PROCEDURE — 96375 TX/PRO/DX INJ NEW DRUG ADDON: CPT

## 2022-03-13 PROCEDURE — 85025 COMPLETE CBC W/AUTO DIFF WBC: CPT

## 2022-03-13 PROCEDURE — 96361 HYDRATE IV INFUSION ADD-ON: CPT

## 2022-03-13 PROCEDURE — 74177 CT ABD & PELVIS W/CONTRAST: CPT | Mod: 26,MA

## 2022-03-13 PROCEDURE — 76830 TRANSVAGINAL US NON-OB: CPT

## 2022-03-13 PROCEDURE — 99285 EMERGENCY DEPT VISIT HI MDM: CPT

## 2022-03-13 RX ORDER — KETOROLAC TROMETHAMINE 30 MG/ML
30 SYRINGE (ML) INJECTION ONCE
Refills: 0 | Status: DISCONTINUED | OUTPATIENT
Start: 2022-03-13 | End: 2022-03-13

## 2022-03-13 RX ORDER — ONDANSETRON 8 MG/1
4 TABLET, FILM COATED ORAL ONCE
Refills: 0 | Status: COMPLETED | OUTPATIENT
Start: 2022-03-13 | End: 2022-03-13

## 2022-03-13 RX ORDER — IBUPROFEN 200 MG
1 TABLET ORAL
Qty: 40 | Refills: 0
Start: 2022-03-13 | End: 2022-03-22

## 2022-03-13 RX ORDER — SODIUM CHLORIDE 9 MG/ML
1000 INJECTION INTRAMUSCULAR; INTRAVENOUS; SUBCUTANEOUS ONCE
Refills: 0 | Status: COMPLETED | OUTPATIENT
Start: 2022-03-13 | End: 2022-03-13

## 2022-03-13 RX ORDER — AZITHROMYCIN 500 MG/1
2000 TABLET, FILM COATED ORAL ONCE
Refills: 0 | Status: COMPLETED | OUTPATIENT
Start: 2022-03-13 | End: 2022-03-13

## 2022-03-13 RX ADMIN — Medication 30 MILLIGRAM(S): at 13:20

## 2022-03-13 RX ADMIN — ONDANSETRON 4 MILLIGRAM(S): 8 TABLET, FILM COATED ORAL at 11:26

## 2022-03-13 RX ADMIN — AZITHROMYCIN 2000 MILLIGRAM(S): 500 TABLET, FILM COATED ORAL at 14:47

## 2022-03-13 RX ADMIN — Medication 30 MILLIGRAM(S): at 13:35

## 2022-03-13 RX ADMIN — SODIUM CHLORIDE 1000 MILLILITER(S): 9 INJECTION INTRAMUSCULAR; INTRAVENOUS; SUBCUTANEOUS at 11:26

## 2022-03-13 RX ADMIN — SODIUM CHLORIDE 1000 MILLILITER(S): 9 INJECTION INTRAMUSCULAR; INTRAVENOUS; SUBCUTANEOUS at 12:26

## 2022-03-13 NOTE — ED PROVIDER NOTE - NSFOLLOWUPINSTRUCTIONS_ED_ALL_ED_FT
Follow up with your pmd within 48 hours.   Return to the ED if any worsening or persistent symptoms.         Pelvic Pain in Women    WHAT YOU NEED TO KNOW:    What do I need to know about pelvic pain? You may have pain on one or both sides of your pelvis. Pelvic pain may occur with certain body positions or activities, such as when you have sex or a bowel movement. It may worsen during your monthly period or after you sit or stand for a long time. Chronic pelvic pain is pain that continues for longer than 6 months.    What causes pelvic pain in women?   •Gynecologic conditions, such as pelvic inflammatory disease (PID), endometriosis, or uterine fibroids      •Bowel and bladder conditions, such as irritable bowel syndrome, bladder inflammation, or tumors       •Muscle and nerve conditions, such as swelling or weakness of your pelvic muscles, or damage to the nerves of your pelvic area (neuropathy)      •Psychological issues as a result of physical or sexual abuse, or drug abuse      How is pelvic pain treated?   •Pain medicine may be given in pills, injections, or creams to relieve your pain.       •Hormones may be given if your pain gets worse with your menstrual cycle.      •Antibiotics may be given if your pain is caused by infection.      •Surgery may be done if other treatments do not relieve your pain.       How can I manage my pelvic pain?   •Keep a pain diary. Write down when your pain happens, how severe it is, and any other symptoms you have with your pain. A diary will help you keep track of pain cycles. It may also help your healthcare provider find out what is causing your pain.      •Learn ways to relax. Deep breathing, meditation, and relaxation techniques can help decrease your pain. When you are tense, your pain may increase.      •Change the foods you eat if you have irritable bowel syndrome. Ask your healthcare provider about the best foods for you.       Call 911 for any of the following:   •You have severe chest pain and sudden trouble breathing.          When should I seek immediate care?   •You have heavy or unusual vaginal bleeding, and you feel lightheaded or faint.          When should I contact my healthcare provider?   •You have pelvic pain that does not go away after you take pain medicine.      •You develop new symptoms or your symptoms are worse than before.      •You have questions or concerns about your condition or care.      CARE AGREEMENT:    You have the right to help plan your care. Learn about your health condition and how it may be treated. Discuss treatment options with your healthcare providers to decide what care you want to receive. You always have the right to refuse treatment.        © Copyright [employer] 2022           back to top                          © Copyright [employer] 2022

## 2022-03-13 NOTE — ED PROVIDER NOTE - ATTENDING CONTRIBUTION TO CARE
51 yr old female with hx of depression presents with b/l lower pelvic pain and suprapubic pain for the last 4 days. Pt reports about 1 week ago, she was having sex with her BF and she developed immediate pain, which then resolved. Pain started 4 days ago, and intensified today. Pt last period 1 year ago. Pt denies any urinary complaints, fever, chills, n/v/d or any other symptoms. Pt sexually active with 1 partner. No hx of STD.   on exam- + suprapubic pain and b/l pelvic tenderness  , pelvic exam WNL   labs, UA, UC, GC and US transvaginal ordered  pelvic sono , ct abd neg   Dr. Freeman:  I have reviewed and discussed with the PA/ resident the case specifics, including the history, physical assessment, evaluation, conclusion, laboratory results, and medical plan. I agree with the contents, and conclusions. I have personally examined, and interviewed the patient.

## 2022-03-13 NOTE — ED ADULT NURSE NOTE - CAS ELECT INFOMATION PROVIDED
Attempt # 2 to [phone].    Left non-detailed VM for patient to call back.    ABELINO Fatima, RN, PHN  [facility]    
Attempt # 3     Called # [phone]  Phone rang 2 times then hung up     Encounter closed     Jaqui Shelton RN, BSN  [facility]       
Labs on 2017 have  and a letter was sent on 2017. Patient has not followed up. Routing to team to address.    [facility]  
Non-detailed message left to return our call.    Evon Valle RN   [employer]      
Please review with patient  
DC instructions

## 2022-03-13 NOTE — ED PROVIDER NOTE - CLINICAL SUMMARY MEDICAL DECISION MAKING FREE TEXT BOX
51 yr old female with hx of depression presents with b/l lower pelvic pain and suprapubic pain for the last 4 days. Pt reports about 1 week ago, she was having sex with her BF and she developed immediate pain, which then resolved. Pain started 4 days ago, and intensified today. Pt last period 1 year ago. Pt denies any urinary complaints, fever, chills, n/v/d or any other symptoms. Pt sexually active with 1 partner. No hx of STD.   on exam- + suprapubic pain and b/l pelvic tenderness  , pelvic exam WNL   labs, UA, UC, GC and US transvaginal ordered 51 yr old female with hx of depression presents with b/l lower pelvic pain and suprapubic pain for the last 4 days. Pt reports about 1 week ago, she was having sex with her BF and she developed immediate pain, which then resolved. Pain started 4 days ago, and intensified today. Pt last period 1 year ago. Pt denies any urinary complaints, fever, chills, n/v/d or any other symptoms. Pt sexually active with 1 partner. No hx of STD.   on exam- + suprapubic pain and b/l pelvic tenderness  , pelvic exam WNL   labs, UA, UC, GC and US transvaginal ordered   labs, ua, US reviewed   CT then ordered, results reviewed   pt stable for DC 51 yr old female with hx of depression presents with b/l lower pelvic pain and suprapubic pain for the last 4 days. Pt reports about 1 week ago, she was having sex with her BF and she developed immediate pain, which then resolved. Pain started 4 days ago, and intensified today. Pt last period 1 year ago. Pt denies any urinary complaints, fever, chills, n/v/d or any other symptoms. Pt sexually active with 1 partner. No hx of STD.   on exam- + suprapubic pain and b/l pelvic tenderness  , pelvic exam WNL   labs, UA, UC, GC and US transvaginal ordered   labs, ua, US reviewed   CT then ordered, results reviewed    GC sent, will treat with Zithromax 2000mg in ED for prophylaxis   pt stable for DC

## 2022-03-13 NOTE — ED ADULT NURSE NOTE - OBJECTIVE STATEMENT
Patient complaint of pelvic pain that started after having intercourse with her boyfriend about a week ago. While walking her dog today, she had severe pelvic pain 8/10 and felt lightheaded and dizziness. Urine HCG negative. Denies nausea, vomiting, SOB, LOC.

## 2022-03-13 NOTE — ED PROVIDER NOTE - PATIENT PORTAL LINK FT
You can access the FollowMyHealth Patient Portal offered by [facility] by registering at the following website: [email or web address]. By joining [employer]’s FollowMyHealth portal, you will also be able to view your health information using other applications (apps) compatible with our system.

## 2022-03-13 NOTE — ED PROVIDER NOTE - OBJECTIVE STATEMENT
51 yr old female with hx of depression presents with b/l lower pelvic pain and suprapubic pain for the last 4 days. Pt reports about 1 week ago, she was having sex with her BF and she developed immediate pain, which then resolved. Pain started 4 days ago, and intensified today. Pt last period 1 year ago. Pt denies any urinary complaints, fever, chills, n/v/d or any other symptoms. Pt sexually active with 1 partner. No hx of STD.

## 2022-03-14 LAB
C TRACH RRNA SPEC QL NAA+PROBE: SIGNIFICANT CHANGE UP
CULTURE RESULTS: SIGNIFICANT CHANGE UP
N GONORRHOEA RRNA SPEC QL NAA+PROBE: SIGNIFICANT CHANGE UP
SPECIMEN SOURCE: SIGNIFICANT CHANGE UP
SPECIMEN SOURCE: SIGNIFICANT CHANGE UP

## 2022-05-20 NOTE — ED ADULT NURSE NOTE - NS ED NOTE ABUSE RESPONSE YN
1 Yes Principal Discharge DX:	Acute chest syndrome   Principal Discharge DX:	Acute sickle cell crisis  Secondary Diagnosis:	Bacterial pneumonia  Secondary Diagnosis:	Anemia, sickle cell with crisis  Secondary Diagnosis:	Acute chest syndrome
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
## 2022-12-21 PROBLEM — F32.A DEPRESSION, UNSPECIFIED: Chronic | Status: ACTIVE | Noted: 2022-03-13

## 2023-02-08 ENCOUNTER — NON-APPOINTMENT (OUTPATIENT)
Age: 53
End: 2023-02-08

## 2023-02-08 ENCOUNTER — APPOINTMENT (OUTPATIENT)
Dept: INTERNAL MEDICINE | Facility: CLINIC | Age: 53
End: 2023-02-08
Payer: COMMERCIAL

## 2023-02-08 ENCOUNTER — TRANSCRIPTION ENCOUNTER (OUTPATIENT)
Age: 53
End: 2023-02-08

## 2023-02-08 VITALS
HEIGHT: 69 IN | TEMPERATURE: 97.2 F | SYSTOLIC BLOOD PRESSURE: 106 MMHG | WEIGHT: 155 LBS | DIASTOLIC BLOOD PRESSURE: 70 MMHG | RESPIRATION RATE: 14 BRPM | OXYGEN SATURATION: 98 % | HEART RATE: 84 BPM | BODY MASS INDEX: 22.96 KG/M2

## 2023-02-08 PROCEDURE — 93000 ELECTROCARDIOGRAM COMPLETE: CPT | Mod: 59

## 2023-02-08 PROCEDURE — 99396 PREV VISIT EST AGE 40-64: CPT | Mod: 25

## 2023-02-08 NOTE — HISTORY OF PRESENT ILLNESS
[de-identified] : cpe\par \par Has consult scheduled for colonoscopy w Dr Rojo. \par \par Last mammogram 5/24/22.\par Postmenopausal. sees GYN. LMP = 2 yrs ago. \par \par Had covid vaccines, flu shot. \par She will come back for shingrix.

## 2023-02-08 NOTE — HEALTH RISK ASSESSMENT
[Yes] : Yes [2 - 4 times a month (2 pts)] : 2-4 times a month (2 points) [1 or 2 (0 pts)] : 1 or 2 (0 points) [No] : In the past 12 months have you used drugs other than those required for medical reasons? No [No falls in past year] : Patient reported no falls in the past year [0] : 2) Feeling down, depressed, or hopeless: Not at all (0) [PHQ-2 Negative - No further assessment needed] : PHQ-2 Negative - No further assessment needed [Patient reported mammogram was normal] : Patient reported mammogram was normal [None] : None [Fully functional (bathing, dressing, toileting, transferring, walking, feeding)] : Fully functional (bathing, dressing, toileting, transferring, walking, feeding) [Fully functional (using the telephone, shopping, preparing meals, housekeeping, doing laundry, using] : Fully functional and needs no help or supervision to perform IADLs (using the telephone, shopping, preparing meals, housekeeping, doing laundry, using transportation, managing medications and managing finances) [Former] : Former [[id number]] : 0 [MammogramDate] : 05/22 [de-identified] : social smoker and  < 20 pack years

## 2023-02-09 LAB
25(OH)D3 SERPL-MCNC: 44.6 NG/ML
ALBUMIN SERPL ELPH-MCNC: 4.8 G/DL
ALP BLD-CCNC: 57 U/L
ALT SERPL-CCNC: 15 U/L
ANION GAP SERPL CALC-SCNC: 12 MMOL/L
AST SERPL-CCNC: 16 U/L
BASOPHILS # BLD AUTO: 0.03 K/UL
BASOPHILS NFR BLD AUTO: 0.6 %
BILIRUB SERPL-MCNC: 0.3 MG/DL
BUN SERPL-MCNC: 11 MG/DL
CALCIUM SERPL-MCNC: 9.8 MG/DL
CHLORIDE SERPL-SCNC: 103 MMOL/L
CHOLEST SERPL-MCNC: 263 MG/DL
CO2 SERPL-SCNC: 25 MMOL/L
CREAT SERPL-MCNC: 0.69 MG/DL
EGFR: 105 ML/MIN/1.73M2
EOSINOPHIL # BLD AUTO: 0.13 K/UL
EOSINOPHIL NFR BLD AUTO: 2.6 %
ESTIMATED AVERAGE GLUCOSE: 114 MG/DL
FOLATE SERPL-MCNC: >20 NG/ML
GLUCOSE SERPL-MCNC: 100 MG/DL
HBA1C MFR BLD HPLC: 5.6 %
HCT VFR BLD CALC: 39.7 %
HDLC SERPL-MCNC: 104 MG/DL
HGB BLD-MCNC: 12.7 G/DL
IMM GRANULOCYTES NFR BLD AUTO: 0.2 %
LDLC SERPL CALC-MCNC: 141 MG/DL
LYMPHOCYTES # BLD AUTO: 2.02 K/UL
LYMPHOCYTES NFR BLD AUTO: 40.6 %
MAN DIFF?: NORMAL
MCHC RBC-ENTMCNC: 32 GM/DL
MCHC RBC-ENTMCNC: 32 PG
MCV RBC AUTO: 100 FL
MONOCYTES # BLD AUTO: 0.36 K/UL
MONOCYTES NFR BLD AUTO: 7.2 %
NEUTROPHILS # BLD AUTO: 2.43 K/UL
NEUTROPHILS NFR BLD AUTO: 48.8 %
NONHDLC SERPL-MCNC: 159 MG/DL
PLATELET # BLD AUTO: 322 K/UL
POTASSIUM SERPL-SCNC: 5.2 MMOL/L
PROT SERPL-MCNC: 7.1 G/DL
RBC # BLD: 3.97 M/UL
RBC # FLD: 13.3 %
SODIUM SERPL-SCNC: 139 MMOL/L
TRIGL SERPL-MCNC: 88 MG/DL
TSH SERPL-ACNC: 1.53 UIU/ML
VIT B12 SERPL-MCNC: >2000 PG/ML
WBC # FLD AUTO: 4.98 K/UL

## 2023-09-06 NOTE — ED ADULT TRIAGE NOTE - CHIEF COMPLAINT QUOTE
Physical Therapy Progress Note    Visit Type: Progress Note  Visit: 7  Referring Provider: Maribel Vang CNP  Medical Diagnosis (from order): Diagnosis Information    Diagnosis  719.41, 338.29 (ICD-9-CM) - M25.511, G89.29 (ICD-10-CM) - Chronic right shoulder pain       Patient alert and oriented X3.    SUBJECTIVE                                                                                                               Pt reports significant improvement on R shoulder active flexion to 0-120 degrees, abd 0-90 degrees and  ER 0-45 degrees with mild - mod pain at end range, and still struggles with backward or overhead reaching, and difficulty with lifting mod to heavy loads      OBJECTIVE                                                                                                                     Range of Motion (ROM)   (degrees unless noted; active unless noted; norms in ( ); negative=lacking to 0, positive=beyond 0)  WNL: JENN, ANTONY, except as noted  Shoulder:    - Flexion (180):        • Right: 120 pain, symptom reaction   Passive: 135    - Abduction (180):        • Right: 90 pain, symptom reaction   Passive: 110    - Internal Rotation:         • Right: pain, symptom reaction        - at 0°:           • Right: 45   Passive: 50    - External Rotation:        • Right: pain, symptom reaction       - at 0°:            • Right: 10   Passive: 15    Strength  (out of 5 unless noted, standard test position unless noted)   5/5: ANTONY BARAJAS, except as noted  Shoulder:     - Flexion:         • Right: 3-, pain     - Extension:         • Right: 3-, pain    - Abduction:        • Right: 3-, pain    - Adduction:         • Right: 3-, pain    - Internal Rotation:          • Right (at 0°): 3-, pain    - External Rotation:         • Right (at 0°) :3-, pain          Palpation  Right  - Levator Scapulae: no palpable tenderness  - Upper Trapezius: no palpable tenderness  - Infraspinatus: tenderness  - Supraspinatus: tenderness  -  Subscapularis: tenderness  - Teres: tenderness  - Biceps: tenderness  Shoulder: [place]/Tendon/Bone  - Anterior Shoulder:  - Right: tenderness  - Subscapularis Tendon:  - Right: tenderness  - Posterior Shoulder:  - Right: tenderness  - Supraspinatus Tendon:  - Right: tenderness    Muscle Flexibility  - Upper Trapezius: • Right: minimal limitation  - Levator Scapulae: • Right: minimal limitation  - Scalenes:  • Right: minimal limitation  - Pectoralis Major:  • Right: minimal limitation  - Pectoralis Minor:  • Right: minimal limitation  - Biceps:  • Right: minimal limitation  - Triceps:  • Right: minimal limitation                 Outcome/Assessments  Outcome Measures:   Quick Disabilities of the Arm, Shoulder and Hand: QuickDash Total Score (Score will not calculate if more then 2 questions are left blank): 35  (scored 0-100; a higher score indicates greater disability) see flowsheet for additional documentation      Treatment     Therapeutic Exercise  Continued from previous therapy session with gradual progression to standing ex:    +Sidelying yelllow resisted supraspinatus ex with towel in between side and elbow   +Standing mini swiss ball scapular/shoulder stabilization, yellow TB resisted flex/abd against wall  +Standing mini swiss ball R shoulder flexion/abd, and towel on doorframe/wall flex/abd  +Standing swiss ball R shoulder forward/lateral rollouts with gentle stretching at end range  +Supine scaption, resisted R shoulder flex, abd, hor abd/add and D1, D2 pattern with red TB -Supine Isometrics R shoulder flex, ext, ER/IR, abd  -Seated scapular retraction, shoulder shrugs, circles     Manual Therapy   Continued gentle STM, gr. 1-2 joint oscillation techniques and jt mobilization PA, inferior and superior glides, MFR on R shoulder as per pt tolerance, at pain free range for improved pain relief, circulation and muscle tone. Deferred Ktape application today d/t pt c/o R distal lateral arm sharp pain with  tenderness. Will continue to monitor.    Skilled input: verbal instruction/cues, tactile instruction/cues, posture correction, facilitation and demonstration    Writer verbally educated and received verbal consent for hand placement, positioning of patient, and techniques to be performed today from patient for therapist position for techniques and hand placement and palpation for techniques as described above and how they are pertinent to the patient's plan of care.  Home Exercise Program  *above indicates provided as part of home exercise program  Access Code: [email or web address]  URL: [email or web address]  Date: 09/06/2023  Prepared by: Sandra Keys    Exercises  - Standing Isometric Shoulder Internal Rotation at Doorway  - 1 x daily - 7 x weekly - 3 sets - 10 reps  - Standing Isometric Shoulder External Rotation with Doorway  - 1 x daily - 7 x weekly - 3 sets - 10 reps  - Isometric Shoulder Flexion at Wall  - 1 x daily - 7 x weekly - 3 sets - 10 reps  - Isometric Shoulder Extension at Wall  - 1 x daily - 7 x weekly - 3 sets - 10 reps  - Isometric Shoulder Internal Rotation  - 1 x daily - 7 x weekly - 3 sets - 10 reps  - Isometric Shoulder Adduction  - 1 x daily - 7 x weekly - 3 sets - 10 reps  - Standing Shoulder External Rotation AAROM with Dowel  - 1 x daily - 7 x weekly - 3 sets - 10 reps  - Seated Shoulder External Rotation AAROM with Dowel  - 1 x daily - 7 x weekly - 3 sets - 10 reps  - Supine Shoulder Press with Dowel  - 1 x daily - 7 x weekly - 3 sets - 10 reps  - Supine Shoulder Press AAROM in Abduction with Dowel  - 1 x daily - 7 x weekly - 3 sets - 10 reps  - Supine Shoulder Flexion AAROM with Dowel  - 1 x daily - 7 x weekly - 3 sets - 10 reps  - Shoulder External Rotation with Anchored Resistance  - 1 x daily - 7 x weekly - 3 sets - 10 reps  - Seated Scapular Retraction  - 1 x daily - 7 x weekly - 3 sets - 10 reps  - Standing Shoulder Horizontal Abduction with Resistance  - 1 x  daily - 7 x weekly - 3 sets - 10 reps  - Shoulder Flexion Wall Slide with Towel  - 1 x daily - 7 x weekly - 3 sets - 10 reps  - Shoulder External Rotation Reactive Isometrics  - 1 x daily - 7 x weekly - 3 sets - 10 reps  - Standing Row with Anchored Resistance  - 1 x daily - 7 x weekly - 3 sets - 10 reps  - Standing Shoulder Internal Rotation with Anchored Resistance  - 1 x daily - 7 x weekly - 3 sets - 10 reps  - Doorway Pec Stretch at 60 Elevation  - 1 x daily - 7 x weekly - 3 sets - 10 reps  - Standing Wall Ball Circles with Mini Swiss Ball  - 1 x daily - 7 x weekly - 3 sets - 10 reps  - Standing Shoulder Diagonal Horizontal Abduction 60/120 Degrees with Resistance  - 1 x daily - 7 x weekly - 3 sets - 10 reps  - Shoulder PNF D2 with Resistance  - 1 x daily - 7 x weekly - 3 sets - 10 reps  - Standing Shoulder Extension with Dowel  - 1 x daily - 7 x weekly - 3 sets - 10 reps  - Standing Shoulder Single Arm PNF D2 Flexion with Resistance  - 1 x daily - 7 x weekly - 3 sets - 10 reps  - Standing Plank on Wall with Reaches and Resistance  - 1 x daily - 7 x weekly - 3 sets - 10 reps      Access Code: [email or web address]  URL: [email or web address]  Date: 07/03/2023  Prepared by: Sandra Keys    Exercises  - Standing Isometric Shoulder Internal Rotation at Doorway  - 1 x daily - 7 x weekly - 3 sets - 10 reps  - Standing Isometric Shoulder External Rotation with Doorway  - 1 x daily - 7 x weekly - 3 sets - 10 reps  - Isometric Shoulder Flexion at Wall  - 1 x daily - 7 x weekly - 3 sets - 10 reps  - Isometric Shoulder Extension at Wall  - 1 x daily - 7 x weekly - 3 sets - 10 reps  - Isometric Shoulder Internal Rotation  - 1 x daily - 7 x weekly - 3 sets - 10 reps  - Isometric Shoulder Adduction  - 1 x daily - 7 x weekly - 3 sets - 10 reps  - Standing Shoulder External Rotation AAROM with Dowel  - 1 x daily - 7 x weekly - 3 sets - 10 reps  - Seated Shoulder External Rotation AAROM with Dowel  - 1 x  daily - 7 x weekly - 3 sets - 10 reps  - Supine Shoulder Press with Dowel  - 1 x daily - 7 x weekly - 3 sets - 10 reps  - Supine Shoulder Press AAROM in Abduction with Dowel  - 1 x daily - 7 x weekly - 3 sets - 10 reps  - Supine Shoulder Flexion AAROM with Dowel  - 1 x daily - 7 x weekly - 3 sets - 10 reps        ASSESSMENT                                                                                                          To date the patient has made gains as expected.  Patient continues to have impairments and functional deficits as noted.  Patient will continue to benefit from skilled care as outlined.    Pt tolerated gradual progression of R UE strengthening and stretching at pain free range, noted improving flexibility and functional activity tolerance, with decreased c/o pain, stiffness, tenderness and fatigue. Pt demos improved active active R shoulder flexion 0-135 degrees, abd 0-90 degrees and ER 0-45 degrees with improved  strength of R shoulder flex/abd/ER/IR by 1/5 grade compared with initial measure, and demos improved tolerance for ADLS/IADLS requiring minimal lifting light loads and overhead reaching.  Pt noted decreased perception of pain during performance of her daily functional tasks as evidenced by her improved score on QDASH score 35 indicating moderate functional impairment compared with 61 during initial assessment. Pt may continue to benefit from continued skilled PT interventions to improve her strength, functional endurance and flexibility and to address functional limitations/restrictions including: difficulty with ADLs/IADLS requiring lifting, overhead reaching, carrying mod to heavy loads, upper and lower body dressing, with inability to return to PLOF and recreational activities.   Pain/symptoms after session (out of 10): 3 (R shoulder)  Education:   - Results of above outlined education: Verbalizes understanding, Demonstrates understanding and Needs reinforcement    PLAN                                                                                                                           Modify interventions, frequency and duration per below.  The following skilled interventions to be implemented to achieve goals listed below:  Neuromuscular Re-Education (05156)  Therapeutic Activity (54292)  Therapeutic Exercise (62546)  Manual Therapy (69959)  Electrical Stimulation Attended (50670)  Heat/Cold (46374)  Ultrasound (41044)    Frequency / Duration  1 times per week tapering as patient progresses for 4 weeks for an estimated total of 4 visits    Suggestions for next session as indicated: Progress per plan of care      Goals   shoulder joint pain   shoulder joint stiffness   shoulder/UE weakness  The above improvements in impairments to assist in obtaining goals listed below  Long Term Goals: to be met by end of plan of care  1. Patient will be able to reach behind back with her right UE to allow for completion of independent upper body dressing tasks such as putting on a jacket and independent hygiene tasks such as washing her back. Status: progressing/ongoing  2. Patient will reach overhead with her right UE without compensation due to right shoulder impairments for completion of household tasks such as putting away items in overhead cabinets by end of the current plan of care. Status: progressing/ongoing  3. Patient will be able to lift 25 pounds overhead using the right UE with proper body mechanics to assist with overhead lifting activities at home. Status: progressing/ongoing  4. Quick DASH: Patient will complete form to reflect an improved calculated score to less than or equal to 10% to indicate patient reported improvement in function/disability/impairment. (minimal clinically important difference = 15.91)  Status: progressing/ongoing  5. Patient will be independent with progressed and modified home exercise program. Status: progressing/ongoing      Therapy procedure time and total  treatment time can be found documented on the Time Entry flowsheet     we need a test we were exposed and I have had a headache

## 2023-12-14 ENCOUNTER — NON-APPOINTMENT (OUTPATIENT)
Age: 53
End: 2023-12-14

## 2024-02-15 NOTE — ED PROVIDER NOTE - CARE PLAN
Patient called back again about medication.     Reviewed what was ordered,  patient said thank you and hung up.    Principal Discharge DX:	Viral illness

## 2024-06-13 ENCOUNTER — NON-APPOINTMENT (OUTPATIENT)
Age: 54
End: 2024-06-13

## 2024-06-26 ENCOUNTER — APPOINTMENT (OUTPATIENT)
Dept: INTERNAL MEDICINE | Facility: CLINIC | Age: 54
End: 2024-06-26
Payer: COMMERCIAL

## 2024-06-26 ENCOUNTER — LABORATORY RESULT (OUTPATIENT)
Age: 54
End: 2024-06-26

## 2024-06-26 VITALS
DIASTOLIC BLOOD PRESSURE: 64 MMHG | BODY MASS INDEX: 21.77 KG/M2 | HEART RATE: 64 BPM | RESPIRATION RATE: 16 BRPM | OXYGEN SATURATION: 98 % | TEMPERATURE: 97.9 F | WEIGHT: 147 LBS | HEIGHT: 69 IN | SYSTOLIC BLOOD PRESSURE: 118 MMHG

## 2024-06-26 DIAGNOSIS — Z00.00 ENCOUNTER FOR GENERAL ADULT MEDICAL EXAMINATION W/OUT ABNORMAL FINDINGS: ICD-10-CM

## 2024-06-26 DIAGNOSIS — F32.A DEPRESSION, UNSPECIFIED: ICD-10-CM

## 2024-06-26 PROCEDURE — 99396 PREV VISIT EST AGE 40-64: CPT

## 2024-06-26 RX ORDER — BUPROPION HYDROCHLORIDE 200 MG/1
200 TABLET, FILM COATED, EXTENDED RELEASE ORAL DAILY
Qty: 30 | Refills: 5 | Status: ACTIVE | COMMUNITY
Start: 2021-02-03 | End: 1900-01-01

## 2024-06-27 DIAGNOSIS — A69.20 LYME DISEASE, UNSPECIFIED: ICD-10-CM

## 2024-06-27 DIAGNOSIS — R74.01 ELEVATION OF LEVELS OF LIVER TRANSAMINASE LEVELS: ICD-10-CM

## 2024-06-27 LAB
25(OH)D3 SERPL-MCNC: 44.6 NG/ML
ALBUMIN SERPL ELPH-MCNC: 4.9 G/DL
ALP BLD-CCNC: 49 U/L
ALT SERPL-CCNC: 21 U/L
ANION GAP SERPL CALC-SCNC: 12 MMOL/L
APPEARANCE: CLEAR
AST SERPL-CCNC: 43 U/L
BACTERIA: NEGATIVE /HPF
BASOPHILS # BLD AUTO: 0.04 K/UL
BASOPHILS NFR BLD AUTO: 0.7 %
BILIRUB SERPL-MCNC: 0.5 MG/DL
BILIRUBIN URINE: NEGATIVE
BLOOD URINE: NEGATIVE
BUN SERPL-MCNC: 9 MG/DL
CALCIUM SERPL-MCNC: 9.7 MG/DL
CAST: 0 /LPF
CHLORIDE SERPL-SCNC: 100 MMOL/L
CHOLEST SERPL-MCNC: 221 MG/DL
CO2 SERPL-SCNC: 26 MMOL/L
COLOR: YELLOW
CREAT SERPL-MCNC: 0.6 MG/DL
EGFR: 108 ML/MIN/1.73M2
EOSINOPHIL # BLD AUTO: 0.13 K/UL
EOSINOPHIL NFR BLD AUTO: 2.2 %
EPITHELIAL CELLS: 0 /HPF
ESTIMATED AVERAGE GLUCOSE: 114 MG/DL
FOLATE SERPL-MCNC: 17.9 NG/ML
GLUCOSE QUALITATIVE U: NEGATIVE MG/DL
GLUCOSE SERPL-MCNC: 88 MG/DL
HBA1C MFR BLD HPLC: 5.6 %
HCT VFR BLD CALC: 38.1 %
HCV AB SER QL: NONREACTIVE
HCV S/CO RATIO: 0.07 S/CO
HDLC SERPL-MCNC: 102 MG/DL
HGB BLD-MCNC: 12 G/DL
IMM GRANULOCYTES NFR BLD AUTO: 0.3 %
KETONES URINE: NEGATIVE MG/DL
LDLC SERPL CALC-MCNC: 103 MG/DL
LEUKOCYTE ESTERASE URINE: NEGATIVE
LYMPHOCYTES # BLD AUTO: 2.42 K/UL
LYMPHOCYTES NFR BLD AUTO: 41.4 %
MAN DIFF?: NORMAL
MCHC RBC-ENTMCNC: 31.1 PG
MCHC RBC-ENTMCNC: 31.5 GM/DL
MCV RBC AUTO: 98.7 FL
MICROSCOPIC-UA: NORMAL
MONOCYTES # BLD AUTO: 0.43 K/UL
MONOCYTES NFR BLD AUTO: 7.4 %
NEUTROPHILS # BLD AUTO: 2.8 K/UL
NEUTROPHILS NFR BLD AUTO: 48 %
NITRITE URINE: NEGATIVE
NONHDLC SERPL-MCNC: 120 MG/DL
PH URINE: 6.5
PLATELET # BLD AUTO: 347 K/UL
POTASSIUM SERPL-SCNC: 4.7 MMOL/L
PROT SERPL-MCNC: 7.4 G/DL
PROTEIN URINE: NEGATIVE MG/DL
RBC # BLD: 3.86 M/UL
RBC # FLD: 14.2 %
RED BLOOD CELLS URINE: 0 /HPF
SODIUM SERPL-SCNC: 138 MMOL/L
SPECIFIC GRAVITY URINE: 1.01
TRIGL SERPL-MCNC: 95 MG/DL
TSH SERPL-ACNC: 0.82 UIU/ML
URATE SERPL-MCNC: 3.1 MG/DL
UROBILINOGEN URINE: 0.2 MG/DL
VIT B12 SERPL-MCNC: 624 PG/ML
WBC # FLD AUTO: 5.84 K/UL
WHITE BLOOD CELLS URINE: 0 /HPF

## 2025-01-15 ENCOUNTER — NON-APPOINTMENT (OUTPATIENT)
Age: 55
End: 2025-01-15

## 2025-04-09 ENCOUNTER — APPOINTMENT (OUTPATIENT)
Dept: OBGYN | Facility: CLINIC | Age: 55
End: 2025-04-09

## 2025-04-09 VITALS
BODY MASS INDEX: 21.77 KG/M2 | TEMPERATURE: 96.6 F | OXYGEN SATURATION: 98 % | WEIGHT: 147 LBS | SYSTOLIC BLOOD PRESSURE: 100 MMHG | DIASTOLIC BLOOD PRESSURE: 60 MMHG | HEART RATE: 65 BPM | HEIGHT: 69 IN

## 2025-04-09 DIAGNOSIS — Z11.51 ENCOUNTER FOR SCREENING FOR HUMAN PAPILLOMAVIRUS (HPV): ICD-10-CM

## 2025-04-09 DIAGNOSIS — Z12.39 ENCOUNTER FOR OTHER SCREENING FOR MALIGNANT NEOPLASM OF BREAST: ICD-10-CM

## 2025-04-09 DIAGNOSIS — N95.1 MENOPAUSAL AND FEMALE CLIMACTERIC STATES: ICD-10-CM

## 2025-04-09 DIAGNOSIS — N76.0 ACUTE VAGINITIS: ICD-10-CM

## 2025-04-09 DIAGNOSIS — Z01.419 ENCOUNTER FOR GYNECOLOGICAL EXAMINATION (GENERAL) (ROUTINE) W/OUT ABNORMAL FINDINGS: ICD-10-CM

## 2025-04-09 LAB
HCG UR QL: NEGATIVE
QUALITY CONTROL: YES

## 2025-04-09 PROCEDURE — 99386 PREV VISIT NEW AGE 40-64: CPT

## 2025-04-09 PROCEDURE — 99212 OFFICE O/P EST SF 10 MIN: CPT | Mod: 25

## 2025-04-09 PROCEDURE — 99459 PELVIC EXAMINATION: CPT

## 2025-04-09 RX ORDER — PROGESTERONE 100 MG/1
100 CAPSULE ORAL DAILY
Qty: 90 | Refills: 3 | Status: ACTIVE | COMMUNITY
Start: 2025-04-09 | End: 1900-01-01

## 2025-04-09 RX ORDER — ESTRADIOL 0.04 MG/D
0.04 PATCH, EXTENDED RELEASE TRANSDERMAL
Refills: 0 | Status: ACTIVE | COMMUNITY

## 2025-04-09 RX ORDER — PROGESTERONE 200 MG/1
200 CAPSULE ORAL
Refills: 0 | Status: ACTIVE | COMMUNITY

## 2025-04-09 RX ORDER — ESTRADIOL 0.04 MG/D
0.04 PATCH, EXTENDED RELEASE TRANSDERMAL
Qty: 1 | Refills: 6 | Status: ACTIVE | COMMUNITY
Start: 2025-04-09 | End: 1900-01-01

## 2025-04-10 LAB
C TRACH RRNA SPEC QL NAA+PROBE: NOT DETECTED
N GONORRHOEA RRNA SPEC QL NAA+PROBE: NOT DETECTED
SOURCE AMPLIFICATION: NORMAL

## 2025-04-11 LAB
CANDIDA VAG CYTO: NOT DETECTED
G VAGINALIS+PREV SP MTYP VAG QL MICRO: NOT DETECTED
HPV HIGH+LOW RISK DNA PNL CVX: NOT DETECTED
T VAGINALIS VAG QL WET PREP: NOT DETECTED

## 2025-04-14 LAB — CYTOLOGY CVX/VAG DOC THIN PREP: NORMAL

## 2025-04-24 ENCOUNTER — APPOINTMENT (OUTPATIENT)
Dept: OBGYN | Facility: CLINIC | Age: 55
End: 2025-04-24

## 2025-04-24 VITALS
SYSTOLIC BLOOD PRESSURE: 108 MMHG | DIASTOLIC BLOOD PRESSURE: 60 MMHG | TEMPERATURE: 97 F | WEIGHT: 147 LBS | OXYGEN SATURATION: 98 % | HEIGHT: 69 IN | HEART RATE: 55 BPM | BODY MASS INDEX: 21.77 KG/M2

## 2025-04-24 DIAGNOSIS — Z78.0 ASYMPTOMATIC MENOPAUSAL STATE: ICD-10-CM

## 2025-04-24 DIAGNOSIS — N95.0 POSTMENOPAUSAL BLEEDING: ICD-10-CM

## 2025-04-24 PROCEDURE — 58100 BIOPSY OF UTERUS LINING: CPT

## 2025-04-24 PROCEDURE — 99214 OFFICE O/P EST MOD 30 MIN: CPT | Mod: 25

## 2025-04-24 PROCEDURE — 99459 PELVIC EXAMINATION: CPT

## 2025-04-28 ENCOUNTER — RESULT REVIEW (OUTPATIENT)
Age: 55
End: 2025-04-28

## 2025-04-28 ENCOUNTER — OUTPATIENT (OUTPATIENT)
Dept: OUTPATIENT SERVICES | Facility: HOSPITAL | Age: 55
LOS: 1 days | End: 2025-04-28
Payer: COMMERCIAL

## 2025-04-28 PROCEDURE — 76830 TRANSVAGINAL US NON-OB: CPT

## 2025-04-28 PROCEDURE — 76830 TRANSVAGINAL US NON-OB: CPT | Mod: 26

## 2025-09-02 ENCOUNTER — OUTPATIENT (OUTPATIENT)
Dept: OUTPATIENT SERVICES | Facility: HOSPITAL | Age: 55
LOS: 1 days | End: 2025-09-02
Payer: COMMERCIAL

## 2025-09-02 ENCOUNTER — APPOINTMENT (OUTPATIENT)
Dept: MAMMOGRAPHY | Facility: HOSPITAL | Age: 55
End: 2025-09-02
Payer: COMMERCIAL

## 2025-09-02 ENCOUNTER — RESULT REVIEW (OUTPATIENT)
Age: 55
End: 2025-09-02

## 2025-09-02 ENCOUNTER — APPOINTMENT (OUTPATIENT)
Dept: ULTRASOUND IMAGING | Facility: HOSPITAL | Age: 55
End: 2025-09-02
Payer: COMMERCIAL

## 2025-09-02 DIAGNOSIS — Z12.39 ENCOUNTER FOR OTHER SCREENING FOR MALIGNANT NEOPLASM OF BREAST: ICD-10-CM

## 2025-09-02 PROCEDURE — 77063 BREAST TOMOSYNTHESIS BI: CPT | Mod: 26

## 2025-09-02 PROCEDURE — 76641 ULTRASOUND BREAST COMPLETE: CPT | Mod: 26,50

## 2025-09-02 PROCEDURE — 77067 SCR MAMMO BI INCL CAD: CPT

## 2025-09-02 PROCEDURE — 76641 ULTRASOUND BREAST COMPLETE: CPT

## 2025-09-02 PROCEDURE — 77067 SCR MAMMO BI INCL CAD: CPT | Mod: 26

## 2025-09-02 PROCEDURE — 77063 BREAST TOMOSYNTHESIS BI: CPT
